# Patient Record
Sex: FEMALE | Race: WHITE | NOT HISPANIC OR LATINO | Employment: FULL TIME | URBAN - METROPOLITAN AREA
[De-identification: names, ages, dates, MRNs, and addresses within clinical notes are randomized per-mention and may not be internally consistent; named-entity substitution may affect disease eponyms.]

---

## 2017-06-30 ENCOUNTER — DOCUMENTATION (OUTPATIENT)
Dept: BARIATRICS/WEIGHT MGMT | Facility: CLINIC | Age: 47
End: 2017-06-30

## 2017-06-30 DIAGNOSIS — R53.83 FATIGUE, UNSPECIFIED TYPE: ICD-10-CM

## 2017-06-30 DIAGNOSIS — R10.13 DYSPEPSIA: Primary | ICD-10-CM

## 2017-06-30 DIAGNOSIS — R06.00 DYSPNEA, UNSPECIFIED TYPE: ICD-10-CM

## 2017-06-30 RX ORDER — MELOXICAM 15 MG/1
15 TABLET ORAL DAILY
COMMUNITY
End: 2017-07-27

## 2017-06-30 RX ORDER — FENOFIBRATE 145 MG/1
145 TABLET, COATED ORAL DAILY
COMMUNITY
End: 2017-11-27

## 2017-06-30 RX ORDER — TRIAMTERENE AND HYDROCHLOROTHIAZIDE 37.5; 25 MG/1; MG/1
1 TABLET ORAL DAILY
COMMUNITY
End: 2017-11-27

## 2017-06-30 RX ORDER — HYDROCODONE BITARTRATE AND ACETAMINOPHEN 5; 325 MG/1; MG/1
1 TABLET ORAL EVERY 6 HOURS PRN
COMMUNITY
End: 2018-01-24 | Stop reason: HOSPADM

## 2017-06-30 RX ORDER — LOSARTAN POTASSIUM AND HYDROCHLOROTHIAZIDE 25; 100 MG/1; MG/1
1 TABLET ORAL DAILY
COMMUNITY
End: 2018-01-21

## 2017-06-30 RX ORDER — CLONIDINE HYDROCHLORIDE 0.2 MG/1
0.2 TABLET ORAL 2 TIMES DAILY
COMMUNITY
End: 2017-07-27

## 2017-06-30 RX ORDER — CARVEDILOL 25 MG/1
37.5 TABLET ORAL 2 TIMES DAILY WITH MEALS
COMMUNITY
End: 2019-02-01

## 2017-06-30 RX ORDER — FELODIPINE 5 MG/1
5 TABLET, EXTENDED RELEASE ORAL DAILY
COMMUNITY
End: 2017-07-27

## 2017-07-26 DIAGNOSIS — R10.13 DYSPEPSIA: ICD-10-CM

## 2017-07-26 DIAGNOSIS — R53.83 FATIGUE, UNSPECIFIED TYPE: ICD-10-CM

## 2017-07-26 DIAGNOSIS — R06.00 DYSPNEA, UNSPECIFIED TYPE: ICD-10-CM

## 2017-07-27 ENCOUNTER — OFFICE VISIT (OUTPATIENT)
Dept: BARIATRICS/WEIGHT MGMT | Facility: CLINIC | Age: 47
End: 2017-07-27

## 2017-07-27 ENCOUNTER — DOCUMENTATION (OUTPATIENT)
Dept: BARIATRICS/WEIGHT MGMT | Facility: CLINIC | Age: 47
End: 2017-07-27

## 2017-07-27 VITALS
SYSTOLIC BLOOD PRESSURE: 122 MMHG | HEART RATE: 84 BPM | DIASTOLIC BLOOD PRESSURE: 88 MMHG | RESPIRATION RATE: 18 BRPM | BODY MASS INDEX: 38.29 KG/M2 | TEMPERATURE: 97.9 F | HEIGHT: 69 IN | WEIGHT: 258.5 LBS | OXYGEN SATURATION: 99 %

## 2017-07-27 DIAGNOSIS — R12 HEARTBURN: Primary | ICD-10-CM

## 2017-07-27 DIAGNOSIS — R53.83 FATIGUE, UNSPECIFIED TYPE: ICD-10-CM

## 2017-07-27 DIAGNOSIS — I10 ESSENTIAL HYPERTENSION: ICD-10-CM

## 2017-07-27 DIAGNOSIS — E66.9 OBESITY, CLASS II, BMI 35-39.9: ICD-10-CM

## 2017-07-27 PROCEDURE — 99205 OFFICE O/P NEW HI 60 MIN: CPT | Performed by: PHYSICIAN ASSISTANT

## 2017-07-27 RX ORDER — ESTRADIOL 1 MG/1
1 TABLET ORAL DAILY
COMMUNITY
End: 2017-11-27

## 2017-07-27 NOTE — PROGRESS NOTES
[facility] BARIATRIC SURGERY  [address]  [facility] [address]  [phone]      Patient  Name:  Nichelle Casey.  :  1970      Date of Visit: 2017      Chief Complaint:  weight gain; unable to maintain weight loss    History of Present Illness:  Nichelle Casey is a 47 y.o. female who presents today for evaluation, education and consultation regarding weight loss surgery. The patient is interested in sleeve gastrectomy.     Nichelle has been overweight for at least 15 years, has been 35 pounds or more overweight for at least 30 years, has been 100 pounds or more overweight for 7 or more years and started dieting at age 15.      Previous diet attempts include: High Protein, Low Carbohydrate, Low Fat, Calorie Counting, Carolyn's Diet, Cabbage Soup and Slim Fast; Weight Watchers; Dexatrim, Ionamin/Adipex and Phendiet.  The most weight Nichelle lost was 50 pounds on diet pills and weight watchers but was only able to maintain that weight loss for less than 1 year.  Her maximum lifetime weight is 258 pounds.      Past Medical History:   Diagnosis Date   • Chronic back pain     herniated disc w/ sciatica   • Dyspepsia    • Dyspnea on exertion    • Elevated LFTs    • Fatigue    • Heartburn     prn OTC meds   • Hormone replacement therapy (HRT)     shell-menopausal   • HTN (hypertension)    • Hypertriglyceridemia    • Obesity    • Osteoarthritis      Past Surgical History:   Procedure Laterality Date   • TUBAL ABDOMINAL LIGATION     • VAGINAL HYSTERECTOMY      partial - uterine fibroids   • WISDOM TOOTH EXTRACTION         Allergies   Allergen Reactions   • Prednisone Shortness Of Breath         Current Outpatient Prescriptions:   •  carvedilol (COREG) 25 MG tablet, Take 25 mg by mouth 2 (Two) Times a Day With Meals. 1.5 bid, Disp: , Rfl:   •  estradiol (ESTRACE) 1 MG tablet, Take 1 mg by mouth Daily., Disp: , Rfl:   •  fenofibrate (TRICOR) 145 MG tablet,  Take 145 mg by mouth Daily., Disp: , Rfl:   •  HYDROcodone-acetaminophen (NORCO) 5-325 MG per tablet, Take 1 tablet by mouth Every 6 (Six) Hours As Needed., Disp: , Rfl:   •  losartan-hydrochlorothiazide (HYZAAR) 100-25 MG per tablet, Take 1 tablet by mouth Daily., Disp: , Rfl:   •  triamterene-hydrochlorothiazide (MAXZIDE-25) 37.5-25 MG per tablet, Take 1 tablet by mouth Daily., Disp: , Rfl:     Social History     Social History   • Marital status:      Spouse name: Devang Casey   • Number of children: 2   • Years of education: 12th grade     Occupational History   •       Social History Main Topics   • Smoking status: Never Smoker   • Smokeless tobacco: Never Used   • Alcohol use No   • Drug use: No   • Sexual activity: Yes     Partners: Male      Comment:       Other Topics Concern   • Not on file     Social History Narrative    Lives in [place], [place] w/ .   for [employer].       Family History   Problem Relation Age of Onset   • Hypertension Mother    • Diabetes Father    • Hypertension Father    • Heart disease Father    • Prostate cancer Father    • Hypertension Sister    • Hypertension Maternal Grandmother    • Diabetes Maternal Grandmother    • Lung cancer Maternal Grandfather    • Emphysema Paternal Grandmother    • Heart attack Paternal Grandfather          Review of Systems:  Constitutional:  The patient reports fatigue, weight gain and denies fevers and chills.  Cardiovascular:  The patient reports HTN and denies heart disease and DVT.  Respiratory:  The patient denies asthma, apnea and PE.  Gastrointestinal:  The patient reports heartburn and denies gallbladder issues.  Genitourinary:  The patient denies renal insufficiency.    Musculoskeletal:  The patient reports joint pain, arthritis and denies fibromyalgia.  Neurological:  The patient denies seizure and stroke.  Psychiatric:  The patient denies anxiety, depression and bipolar  disorder.  Endocrine:  The patient denies diabetes and thyroid disease.  Hematologic:  The patient denies anemia and bleeding disorder.  Skin:  The patient denies MRSA.    Physical Exam:  Vital Signs:  Weight: 258 lb 8 oz (117 kg)   Body mass index is 38.73 kg/(m^2).  Temp: 97.9 °F (36.6 °C)   Heart Rate: 84   BP: 122/88     Physical Exam   Constitutional: She is oriented to person, place, and time. She appears well-developed and well-nourished.   HENT:   Head: Normocephalic and atraumatic.   Eyes: Conjunctivae are normal. No scleral icterus.   Neck: Neck supple. No thyromegaly present.   Cardiovascular: Normal rate and regular rhythm.    No murmur heard.  Pulmonary/Chest: Effort normal and breath sounds normal. No respiratory distress. She has no wheezes. She has no rales.   Abdominal: Soft. Bowel sounds are normal. She exhibits no distension and no mass. There is no tenderness. No hernia.   Musculoskeletal: Normal range of motion. She exhibits no edema.   Neurological: She is alert and oriented to person, place, and time. Gait normal.   Skin: Skin is warm and dry. No rash noted.   Psychiatric: She has a normal mood and affect. Judgment normal.   Vitals reviewed.         Patient Active Problem List   Diagnosis   • Obesity   • Fatigue   • Dyspepsia   • Dyspnea on exertion   • Hormone replacement therapy (HRT)   • HTN (hypertension)   • Hypertriglyceridemia   • Chronic back pain   • Heartburn   • Elevated LFTs   • Osteoarthritis       Assessment:    Nichelle Casey is a 47 y.o. year old female with medically complicated obesity pursuing sleeve gastrectomy.    Weight loss surgery is deemed medically necessary given the following obesity related comorbidities including hypertension with current Weight: 258 lb 8 oz (117 kg) and Body mass index is 38.73 kg/(m^2)..    Plan:  The consultation plan and program requirements were reviewed with the patient.  The patient has been advised that a letter of medical support must  be obtained from her primary care physician or referring provider. A psychological evaluation will be arranged.  A nutritional evaluation will be performed.  The patient was advised to start a high protein and low carbohydrate diet.  Necessary lifestyle modifications were discussed.  Instructions on how to access FARA was given to the patient.  FARA is an internet based educational video that explains the surgical procedure chosen and answers basic questions regarding that procedure.     Preoperative testing will include: CBC, CMP, Fasting Lipids, TSH, H.Pylori, CXR, EKG, EGD.    Additional preop clearances required prior to surgery: None.      Patient understands that bariatric surgery is not cosmetic surgery but rather a tool to help make a lifelong commitment to lifestyle changes including diet, exercise, behavior modifications, and healthy habits.      ANTIONE Robles

## 2017-07-27 NOTE — PROGRESS NOTES
"Weight Loss Surgery  Presurgical Nutrition Assessment     Nichelle Casey  07/27/2017  [id number]  [id number]  1970  female    Surgery desired: Sleeve Gastrectomy    Ht 68.5\" (174 cm);  Wt 258.5 # (117 kg);  BMI 38.7  Past Medical History:   Diagnosis Date   • Chronic back pain     herniated disc w/ sciatica   • Dyspepsia    • Dyspnea on exertion    • Fatigue    • Hormone replacement therapy (HRT)     shell-menopausal   • HTN (hypertension)    • Hypertriglyceridemia    • Obesity      Past Surgical History:   Procedure Laterality Date   • TUBAL ABDOMINAL LIGATION  1990   • VAGINAL HYSTERECTOMY  2009    partial - uterine fibroids   • WISDOM TOOTH EXTRACTION  1993     Allergies   Allergen Reactions   • Prednisone Shortness Of Breath       Current Outpatient Prescriptions:   •  carvedilol (COREG) 25 MG tablet, Take 25 mg by mouth 2 (Two) Times a Day With Meals. 1.5 bid, Disp: , Rfl:   •  estradiol (ESTRACE) 1 MG tablet, Take 1 mg by mouth Daily., Disp: , Rfl:   •  fenofibrate (TRICOR) 145 MG tablet, Take 145 mg by mouth Daily., Disp: , Rfl:   •  HYDROcodone-acetaminophen (NORCO) 5-325 MG per tablet, Take 1 tablet by mouth Every 6 (Six) Hours As Needed., Disp: , Rfl:   •  losartan-hydrochlorothiazide (HYZAAR) 100-25 MG per tablet, Take 1 tablet by mouth Daily., Disp: , Rfl:   •  triamterene-hydrochlorothiazide (MAXZIDE-25) 37.5-25 MG per tablet, Take 1 tablet by mouth Daily., Disp: , Rfl:       Nutrition Assessment    Estimated energy needs:  1850 kcal    Estimated calories for weight loss:  1350 kcal    IBW (Pounds):  165 #        Excess body weight (Pounds):  93.5 #       Nutrition Recall  24 Hour recall: (B) (L) (D) -  Reviewed and discussed with patient.  Loves breads & ingests multiple times throughout the day:  Cornbread, biscuits (with gravy); zucchini bread.  Eats vegetables & fruits regularly. 1 to 1.5 serv.meat/eggs qd.       Exercise  never      Education    Provided information packet containing " guidelines for healthy eating & choosing a protein supplement.  Discussed setting goals for replacing unhealthy eating habits with healthier ones.       Recommend that team proceed with surgery and follow per protocol.      Nutrition Goals   Dietary Guidelines per packet, as described above.  Protein goal:  grams per day   -140 g qd  Eat less bread & fewer snack foods.    Exercise Goals  Continue current exercise routine   Add 15-30 minutes of activity per day as tolerated      Paige Gardner RD  07/27/2017  11:40 AM

## 2017-07-31 LAB
ALBUMIN SERPL-MCNC: 4 G/DL (ref 3.2–4.8)
ALBUMIN/GLOB SERPL: 1.5 G/DL (ref 1.5–2.5)
ALP SERPL-CCNC: 71 U/L (ref 25–100)
ALT SERPL-CCNC: 30 U/L (ref 7–40)
AST SERPL-CCNC: 18 U/L (ref 0–33)
BILIRUB SERPL-MCNC: 0.8 MG/DL (ref 0.3–1.2)
BUN SERPL-MCNC: 11 MG/DL (ref 9–23)
BUN/CREAT SERPL: 15.7 (ref 7–25)
CALCIUM SERPL-MCNC: 8.9 MG/DL (ref 8.7–10.4)
CHLORIDE SERPL-SCNC: 104 MMOL/L (ref 99–109)
CHOLEST SERPL-MCNC: 165 MG/DL (ref 0–200)
CO2 SERPL-SCNC: 25 MMOL/L (ref 20–31)
CREAT SERPL-MCNC: 0.7 MG/DL (ref 0.6–1.3)
ERYTHROCYTE [DISTWIDTH] IN BLOOD BY AUTOMATED COUNT: 14.5 % (ref 11.3–14.5)
GLOBULIN SER CALC-MCNC: 2.6 GM/DL
GLUCOSE SERPL-MCNC: 99 MG/DL (ref 70–100)
H PYLORI IGA SER-ACNC: <9 UNITS (ref 0–8.9)
H PYLORI IGG SER IA-ACNC: <0.9 U/ML (ref 0–0.8)
H PYLORI IGM SER-ACNC: <9 UNITS (ref 0–8.9)
HCT VFR BLD AUTO: 37.7 % (ref 34.5–44)
HDLC SERPL-MCNC: 40 MG/DL (ref 40–60)
HGB BLD-MCNC: 12.4 G/DL (ref 11.5–15.5)
LDLC SERPL CALC-MCNC: 74 MG/DL (ref 0–100)
MCH RBC QN AUTO: 28.4 PG (ref 27–31)
MCHC RBC AUTO-ENTMCNC: 32.9 G/DL (ref 32–36)
MCV RBC AUTO: 86.3 FL (ref 80–99)
PLATELET # BLD AUTO: 187 10*3/MM3 (ref 150–450)
POTASSIUM SERPL-SCNC: 3.8 MMOL/L (ref 3.5–5.5)
PROT SERPL-MCNC: 6.6 G/DL (ref 5.7–8.2)
RBC # BLD AUTO: 4.37 10*6/MM3 (ref 3.89–5.14)
SODIUM SERPL-SCNC: 140 MMOL/L (ref 132–146)
TRIGL SERPL-MCNC: 253 MG/DL (ref 0–150)
TSH SERPL DL<=0.005 MIU/L-ACNC: 1.48 MIU/ML (ref 0.35–5.35)
VLDLC SERPL CALC-MCNC: 50.6 MG/DL
WBC # BLD AUTO: 7.93 10*3/MM3 (ref 3.5–10.8)

## 2017-08-11 ENCOUNTER — APPOINTMENT (OUTPATIENT)
Dept: PREADMISSION TESTING | Facility: HOSPITAL | Age: 47
End: 2017-08-11

## 2017-08-11 LAB
HCT VFR BLD AUTO: 37.7 % (ref 34.5–44)
POTASSIUM BLD-SCNC: 3.7 MMOL/L (ref 3.5–5.5)

## 2017-08-11 PROCEDURE — 36415 COLL VENOUS BLD VENIPUNCTURE: CPT

## 2017-08-11 PROCEDURE — 93005 ELECTROCARDIOGRAM TRACING: CPT

## 2017-08-11 PROCEDURE — 85014 HEMATOCRIT: CPT | Performed by: ANESTHESIOLOGY

## 2017-08-11 PROCEDURE — 84132 ASSAY OF SERUM POTASSIUM: CPT | Performed by: ANESTHESIOLOGY

## 2017-08-14 ENCOUNTER — OUTSIDE FACILITY SERVICE (OUTPATIENT)
Dept: BARIATRICS/WEIGHT MGMT | Facility: CLINIC | Age: 47
End: 2017-08-14

## 2017-08-14 ENCOUNTER — LAB REQUISITION (OUTPATIENT)
Dept: LAB | Facility: HOSPITAL | Age: 47
End: 2017-08-14

## 2017-08-14 DIAGNOSIS — R12 HEARTBURN: ICD-10-CM

## 2017-08-14 PROCEDURE — 43239 EGD BIOPSY SINGLE/MULTIPLE: CPT | Performed by: SURGERY

## 2017-08-14 PROCEDURE — 88305 TISSUE EXAM BY PATHOLOGIST: CPT | Performed by: SURGERY

## 2017-08-16 LAB
CYTO UR: NORMAL
LAB AP CASE REPORT: NORMAL
LAB AP CLINICAL INFORMATION: NORMAL
Lab: NORMAL
PATH REPORT.FINAL DX SPEC: NORMAL
PATH REPORT.GROSS SPEC: NORMAL

## 2017-11-16 DIAGNOSIS — R06.00 DYSPNEA, UNSPECIFIED TYPE: Primary | ICD-10-CM

## 2017-11-16 DIAGNOSIS — R06.00 DYSPNEA, UNSPECIFIED TYPE: ICD-10-CM

## 2017-11-16 DIAGNOSIS — R53.83 FATIGUE, UNSPECIFIED TYPE: ICD-10-CM

## 2017-11-27 ENCOUNTER — CONSULT (OUTPATIENT)
Dept: BARIATRICS/WEIGHT MGMT | Facility: CLINIC | Age: 47
End: 2017-11-27

## 2017-11-27 VITALS
RESPIRATION RATE: 18 BRPM | SYSTOLIC BLOOD PRESSURE: 160 MMHG | HEART RATE: 71 BPM | OXYGEN SATURATION: 99 % | DIASTOLIC BLOOD PRESSURE: 99 MMHG | TEMPERATURE: 97.6 F | HEIGHT: 69 IN | WEIGHT: 247 LBS | BODY MASS INDEX: 36.58 KG/M2

## 2017-11-27 DIAGNOSIS — E78.5 HYPERLIPIDEMIA, UNSPECIFIED HYPERLIPIDEMIA TYPE: ICD-10-CM

## 2017-11-27 DIAGNOSIS — R53.83 FATIGUE, UNSPECIFIED TYPE: Primary | ICD-10-CM

## 2017-11-27 DIAGNOSIS — R10.13 DYSPEPSIA: ICD-10-CM

## 2017-11-27 DIAGNOSIS — M54.9 BACK PAIN, UNSPECIFIED BACK LOCATION, UNSPECIFIED BACK PAIN LATERALITY, UNSPECIFIED CHRONICITY: ICD-10-CM

## 2017-11-27 DIAGNOSIS — E66.9 OBESITY, CLASS II, BMI 35-39.9: ICD-10-CM

## 2017-11-27 DIAGNOSIS — I10 ESSENTIAL HYPERTENSION: ICD-10-CM

## 2017-11-27 PROCEDURE — 99214 OFFICE O/P EST MOD 30 MIN: CPT | Performed by: SURGERY

## 2017-11-27 RX ORDER — SODIUM CHLORIDE 9 MG/ML
150 INJECTION, SOLUTION INTRAVENOUS CONTINUOUS
Status: CANCELLED | OUTPATIENT
Start: 2017-11-27

## 2017-11-27 RX ORDER — GABAPENTIN 300 MG/1
300 CAPSULE ORAL 3 TIMES DAILY
COMMUNITY
Start: 2017-11-10 | End: 2018-02-20

## 2017-11-27 RX ORDER — SCOLOPAMINE TRANSDERMAL SYSTEM 1 MG/1
1 PATCH, EXTENDED RELEASE TRANSDERMAL
Status: CANCELLED | OUTPATIENT
Start: 2017-11-27

## 2017-11-27 RX ORDER — PANTOPRAZOLE SODIUM 40 MG/10ML
40 INJECTION, POWDER, LYOPHILIZED, FOR SOLUTION INTRAVENOUS ONCE
Status: CANCELLED | OUTPATIENT
Start: 2017-11-27 | End: 2017-11-27

## 2017-11-27 RX ORDER — TRIAMTERENE AND HYDROCHLOROTHIAZIDE 37.5; 25 MG/1; MG/1
1 TABLET ORAL DAILY
COMMUNITY
End: 2018-01-24 | Stop reason: HOSPADM

## 2017-11-27 RX ORDER — TRIAMTERENE AND HYDROCHLOROTHIAZIDE 37.5; 25 MG/1; MG/1
CAPSULE ORAL
COMMUNITY
Start: 2017-11-18 | End: 2017-11-27 | Stop reason: SDUPTHER

## 2017-11-27 NOTE — H&P
[facility] BARIATRIC SURGERY  [address]  [facility] [address] [phone]        Patient  Name:  Nichelle Casey  :  1970        Date of Visit: 2017        Chief Complaint:  weight gain; unable to maintain weight loss     History of Present Illness:  Nichelle Casey is a 47 y.o. female who presents today for evaluation, education and consultation regarding weight loss surgery.      Patient has been overweight for many years, with numerous failed dietary/weight loss attempts.  She now has obesity related comorbidities of hypertension, hyperlipidemia, chronic back pain, dyspnea on exertion, fatigue, and OA, and as such has decided to pursue weight loss surgery.  She denies any change in medical history.  The patient attended >90 minute class today with power point presentation on the r/b/a of bariatric surgery, listened well, and asked appropriate questions that showed her understanding of the surgery, its risks, and expectation of dietary/medication/lifestyle compliance after surgery.       Review of data:     OSORIO: Norco 5, gabapentin  CBC: nl  CMP: K 3.2 (on Hctz)  EGD: PQ, 17 39 cm, no HH, De reflux, mild chronic gastritis  HP negative  Cardiac clearance: done, I do not have in hand but per patient cleared  Echo: n/a  Stress test: n/a  PFTs: n/a  Pulm clearance: n/a  EKG: NSR, nonspecific T wave abnormality  CXR: nl        Last tobacco: n/a  Last NSAIDs: 2 weeks ago  Last ASA: n/a  Last steroids: n/a  Last hormones: n/a         Medical History         Past Medical History:   Diagnosis Date   • Chronic back pain       herniated disc w/ sciatica   • Dyspepsia     • Dyspnea on exertion     • Elevated LFTs     • Fatigue     • Heartburn       prn OTC meds   • Hormone replacement therapy (HRT)       shell-menopausal   • HTN (hypertension)     • Hypertriglyceridemia     • Obesity     • Osteoarthritis            Surgical History          Past Surgical  History:   Procedure Laterality Date   • TUBAL ABDOMINAL LIGATION   1990   • VAGINAL HYSTERECTOMY   2009     partial - uterine fibroids, laparoscopic   • WISDOM TOOTH EXTRACTION   1993                 Allergies   Allergen Reactions   • Prednisone Shortness Of Breath         Current Outpatient Prescriptions:   •  carvedilol (COREG) 25 MG tablet, Take 25 mg by mouth 2 (Two) Times a Day With Meals. 1.5 bid, Disp: , Rfl:   •  HYDROcodone-acetaminophen (NORCO) 5-325 MG per tablet, Take 1 tablet by mouth Every 6 (Six) Hours As Needed., Disp: , Rfl:   •  losartan-hydrochlorothiazide (HYZAAR) 100-25 MG per tablet, Take 1 tablet by mouth Daily., Disp: , Rfl:   •  triamterene-hydrochlorothiazide (MAXZIDE-25) 37.5-25 MG per tablet, Take 1 tablet by mouth Daily., Disp: , Rfl:   •  gabapentin (NEURONTIN) 300 MG capsule, 300 mg 3 (Three) Times a Day., Disp: , Rfl:       Social History    Social History            Social History   • Marital status:        Spouse name: Devang Casey   • Number of children: 2   • Years of education: 12th grade           Occupational History   •                Social History Main Topics   • Smoking status: Never Smoker   • Smokeless tobacco: Never Used   • Alcohol use No   • Drug use: No   • Sexual activity: Yes       Partners: Male         Comment:             Other Topics Concern   • Not on file          Social History Narrative     Lives in [facility]/ .   for [employer].                 Family History   Problem Relation Age of Onset   • Hypertension Mother     • Diabetes Father     • Hypertension Father     • Heart disease Father     • Prostate cancer Father     • Hypertension Sister     • Hypertension Maternal Grandmother     • Diabetes Maternal Grandmother     • Lung cancer Maternal Grandfather     • Emphysema Paternal Grandmother     • Heart attack Paternal Grandfather           Review of Systems:  Constitutional:  The patient  reports fatigue, weight gain and denies fevers and chills.  Cardiovascular:  The patient reports HTN and denies heart disease and DVT.  Respiratory:  The patient reports none and denies asthma, apnea and PE.  Gastrointestinal:  The patient reports heartburn and denies pancreatitis, liver disease and IBS.  Genitourinary:  The patient denies renal insufficiency.    Musculoskeletal:  The patient reports joint pain, arthritis and denies fibromyalgia.  Neurological:  The patient reports none and denies seizure and stroke.  Psychiatric:  The patient reports none and denies anxiety, depression and bipolar disorder.  Endocrine:  The patient reports none and denies diabetes, thyroid disease and gout.  Hematologic:  The patient reports none and denies anemia and bleeding disorder.  Skin:  The patient denies MRSA.        Physical Exam:  Vital Signs:  Weight: 247 lb (112 kg)   Body mass index is 37.01 kg/(m^2).  Temp: 97.6 °F (36.4 °C)   Heart Rate: 71   BP: 160/99      Physical Exam   Constitutional: She is oriented to person, place, and time. She appears well-developed and well-nourished. No distress.   Eyes: Conjunctivae and EOM are normal. Pupils are equal, round, and reactive to light.   Neck: Normal range of motion. Neck supple. No tracheal deviation present. No thyromegaly present.   Cardiovascular: Normal rate, regular rhythm and normal heart sounds.    Pulmonary/Chest: Effort normal and breath sounds normal. No respiratory distress.   Abdominal: Soft. She exhibits no distension. There is no tenderness.       Musculoskeletal: Normal range of motion. She exhibits no edema or deformity.   Neurological: She is alert and oriented to person, place, and time. No cranial nerve deficit.   Skin: Skin is warm and dry. She is not diaphoretic. No erythema.   Psychiatric: She has a normal mood and affect. Her behavior is normal. Judgment and thought content normal.             Patient Active Problem List   Diagnosis   • Obesity   •  Fatigue   • Dyspepsia   • Dyspnea on exertion   • Hormone replacement therapy (HRT)   • HTN (hypertension)   • Hypertriglyceridemia   • Chronic back pain   • Heartburn   • Elevated LFTs   • Osteoarthritis         Assessment:     Nichelle Casey is a 47 y.o. year old female with medically complicated obesity.     Weight loss surgery is deemed medically necessary given the following obesity related comorbidities including hypertension, dyslipidemia, osteoarthritis, back pain, knee pain and GERD with current Weight: 247 lb (112 kg) and Body mass index is 37.01 kg/(m^2)..     Patient is aware that surgery is a tool, and that weight loss is not guaranteed but only seen in the context of appropriate use, follow up and exercise.     The patient was present for an approximately a 2.5 hour discussion of the purpose of weight loss surgery, how WLS is a tool to assist in achieving weight loss goals, the most common complications and how best to avoid them, and the strategies for short and long term weight loss.  Ample opportunity to discuss questions was available both in group and during the time of individual examination.     I reviewed all available preop labs, Xrays, tests, clearances, etc and signed off on these in the record.  All of this in addition to the patient's unique history and exam has been taken into consideration in determining their appropriate candidacy for weight loss surgery.     Complications  of laparoscopic/possible robotic gastric sleeve were discussed. The patient is well aware of the potential complications of surgery that include but not limited to bleeding, infections, deep venous thrombosis, pulmonary embolism, pulmonary complications such as pneumonia, cardiac events, hernias, small bowel obstruction, damage to the spleen or other organs, bowel injury, disfiguring scars, failure to lose weight, need for additional surgery, conversion to an open procedure, and death. Patient is also aware of  "complications which apply in this particular procedure that can include but are not limited to a \"leak\" at the staple line which in some instances may require conversion to gastric bypass.     The patient is aware if a hiatal hernia is encountered, it likely will be repaired.  R/B/A Rx to hiatal hernia repair were discussed as outlined in our long consent form.  Briefly risks in addition to those for LSG include recurrent hernia, LACIE, dysphagia, esophageal injury, pneumothorax, injury to the vagus nerves, injury to the thoracic duct, aorta or vena cava.     Greater than 3 minutes was spent with the patient discussing avoiding all tobacco products and second hand smoke at least 2 weeks pre-operatively and 6 weeks post-operatively to minimize the risk of sleeve leak.  This included discussing the importance of avoiding even secondhand smoke as the risk of leak is increased.  Examples discussed:  I made it very clear that the patient understands they should avoid even riding in a car where someone has previously smoked in the last 2 weeks, living in a house where someone smokes (even if it's in a separate room/patio/attached garage, etc.) we discussed that they should not have a conversation with a group of people who are smoking even if it's outside.  They can be around wood burning fires and barbecue.  I told them I do not know if marijuana has a same effects but my overall recommendation is to avoid it for 2 weeks prior in 6 weeks after surgery.  They also are aware that nicotine may also increase the risk of leak and I strongly encouraged him to avoid that as well for 2 weeks prior in 6 weeks after surgery.     Discussed the risks, benefits and alternative therapies at great length as outlined in our extensive consent forms, consent videos, and educational teaching process under the direction of the center's .     A copy of the patient's signed informed consent is on file.     Plan:  Laparoscopic " sleeve gastrectomy         Millie Mccann MD

## 2017-11-28 PROBLEM — E66.9 OBESITY, CLASS II, BMI 35-39.9: Status: ACTIVE | Noted: 2017-11-28

## 2017-12-11 ENCOUNTER — APPOINTMENT (OUTPATIENT)
Dept: PREADMISSION TESTING | Facility: HOSPITAL | Age: 47
End: 2017-12-11

## 2018-01-04 ENCOUNTER — CONSULT (OUTPATIENT)
Dept: BARIATRICS/WEIGHT MGMT | Facility: CLINIC | Age: 48
End: 2018-01-04

## 2018-01-04 VITALS
HEART RATE: 77 BPM | DIASTOLIC BLOOD PRESSURE: 89 MMHG | HEIGHT: 69 IN | WEIGHT: 246.5 LBS | BODY MASS INDEX: 36.51 KG/M2 | RESPIRATION RATE: 18 BRPM | SYSTOLIC BLOOD PRESSURE: 135 MMHG | TEMPERATURE: 98.1 F | OXYGEN SATURATION: 99 %

## 2018-01-04 DIAGNOSIS — M54.9 BACK PAIN, UNSPECIFIED BACK LOCATION, UNSPECIFIED BACK PAIN LATERALITY, UNSPECIFIED CHRONICITY: ICD-10-CM

## 2018-01-04 DIAGNOSIS — R06.00 DYSPNEA, UNSPECIFIED TYPE: ICD-10-CM

## 2018-01-04 DIAGNOSIS — R12 HEARTBURN: ICD-10-CM

## 2018-01-04 DIAGNOSIS — I10 ESSENTIAL HYPERTENSION: ICD-10-CM

## 2018-01-04 DIAGNOSIS — R10.13 DYSPEPSIA: ICD-10-CM

## 2018-01-04 DIAGNOSIS — E78.5 HYPERLIPIDEMIA, UNSPECIFIED HYPERLIPIDEMIA TYPE: ICD-10-CM

## 2018-01-04 DIAGNOSIS — E66.9 OBESITY, CLASS II, BMI 35-39.9: ICD-10-CM

## 2018-01-04 DIAGNOSIS — R53.83 FATIGUE, UNSPECIFIED TYPE: Primary | ICD-10-CM

## 2018-01-04 PROCEDURE — 99214 OFFICE O/P EST MOD 30 MIN: CPT | Performed by: SURGERY

## 2018-01-04 NOTE — PROGRESS NOTES
[facility] BARIATRIC SURGERY  [address]  [facility] [address]  [phone]      Patient  Name:  Nichelle Casey  :  1970      Date of Visit: 2018      Chief Complaint:  weight gain; unable to maintain weight loss    History of Present Illness:  Nichelle Casey is a 47 y.o. female who presents today for evaluation, education and consultation regarding weight loss surgery.      Nichelle Casey is a 47 y.o. female who presents today for evaluation, education and consultation regarding weight loss surgery.     Nichelle returns for last visit today. She has been overweight for many years with may failed weight loss attempts.  She now has comorbidities of HTN, HLD, chronic back pain, dyspnea on exertion, fatigue, and OA, and has decided to pursue weight loss surgery.  There has been no change in medical history.  Since last visit, she has obtained Cardiac clearance for surgery, and no further cardiac workup was needed.    She has attended >90 minutes class today and listened attentively, asking appropriate questions that demonstrated her understanding adherence to dietary/medication/lifestyle/vitamin recommendations.     Review of data:    OSORIO: Norco 5, gabapentin  CBC: nl  CMP: K 3.2 (on Hctz)  EGD: PQ, 17 39 cm, no HH, De reflux, mild chronic gastritis  HP negative  Cardiac clearance: 2017, cleared  Echo: n/a  Stress test: n/a  PFTs: n/a  Pulm clearance: n/a  EKG: NSR, nonspecific T wave abnormality  CXR: nl      Last tobacco: n/a  Last NSAIDs: 2 weeks ago  Last ASA: n/a  Last steroids: n/a  Last hormones: n/a      Past Medical History:   Diagnosis Date   • Chronic back pain     herniated disc w/ sciatica   • Dyspepsia    • Dyspnea on exertion    • Elevated LFTs    • Fatigue    • Heartburn     prn OTC meds   • Hormone replacement therapy (HRT)     shell-menopausal   • HTN (hypertension)    • Hypertriglyceridemia    • Obesity    • Osteoarthritis       Past Surgical History:   Procedure Laterality Date   • TUBAL ABDOMINAL LIGATION  1990   • VAGINAL HYSTERECTOMY  2009    partial - uterine fibroids, laparoscopic   • WISDOM TOOTH EXTRACTION  1993       Allergies   Allergen Reactions   • Prednisone Shortness Of Breath       Current Outpatient Prescriptions:   •  carvedilol (COREG) 25 MG tablet, Take 25 mg by mouth 2 (Two) Times a Day With Meals. 1.5 bid, Disp: , Rfl:   •  gabapentin (NEURONTIN) 300 MG capsule, 300 mg 3 (Three) Times a Day., Disp: , Rfl:   •  losartan-hydrochlorothiazide (HYZAAR) 100-25 MG per tablet, Take 1 tablet by mouth Daily., Disp: , Rfl:   •  triamterene-hydrochlorothiazide (MAXZIDE-25) 37.5-25 MG per tablet, Take 1 tablet by mouth Daily., Disp: , Rfl:   •  HYDROcodone-acetaminophen (NORCO) 5-325 MG per tablet, Take 1 tablet by mouth Every 6 (Six) Hours As Needed., Disp: , Rfl:     Social History     Social History   • Marital status:      Spouse name: Devang Casey   • Number of children: 2   • Years of education: 12th grade     Occupational History   •       Social History Main Topics   • Smoking status: Never Smoker   • Smokeless tobacco: Never Used   • Alcohol use No   • Drug use: No   • Sexual activity: Yes     Partners: Male      Comment:       Other Topics Concern   • Not on file     Social History Narrative    Lives in [facility]/ .   for [employer].       Family History   Problem Relation Age of Onset   • Hypertension Mother    • Diabetes Father    • Hypertension Father    • Heart disease Father    • Prostate cancer Father    • Hypertension Sister    • Hypertension Maternal Grandmother    • Diabetes Maternal Grandmother    • Lung cancer Maternal Grandfather    • Emphysema Paternal Grandmother    • Heart attack Paternal Grandfather        Review of Systems:  Constitutional:  The patient reports fatigue, weight gain and denies fevers and chills.  Cardiovascular:  The  patient reports HTN and denies heart disease and DVT.  Respiratory:  The patient reports none and denies asthma, apnea and PE.  Gastrointestinal:  The patient reports heartburn and denies pancreatitis, liver disease and IBS.  Genitourinary:  The patient denies renal insufficiency.    Musculoskeletal:  The patient reports joint pain, arthritis and denies fibromyalgia.  Neurological:  The patient reports none and denies seizure and stroke.  Psychiatric:  The patient reports none and denies anxiety, depression and bipolar disorder.  Endocrine:  The patient reports none and denies diabetes, thyroid disease and gout.  Hematologic:  The patient reports none and denies anemia and bleeding disorder.  Skin:  The patient denies MRSA.    Physical Exam:  Vital Signs:  Weight: 112 kg (246 lb 8 oz)   Body mass index is 36.94 kg/(m^2).  Temp: 98.1 °F (36.7 °C)   Heart Rate: 77   BP: 135/89     Physical Exam   Constitutional: She is oriented to person, place, and time. She appears well-developed and well-nourished. No distress.   HENT:   Head: Normocephalic and atraumatic.   Mouth/Throat: No oropharyngeal exudate.   Eyes: Conjunctivae and EOM are normal. Pupils are equal, round, and reactive to light.   Neck: Normal range of motion. Neck supple. No tracheal deviation present. No thyromegaly present.   Cardiovascular: Normal rate, regular rhythm and normal heart sounds.    Pulmonary/Chest: Effort normal and breath sounds normal.   Abdominal: Soft. She exhibits no distension. There is no tenderness.   umbi hernia, small umbilical port site incision   Musculoskeletal: Normal range of motion. She exhibits no edema or deformity.   Neurological: She is alert and oriented to person, place, and time. No cranial nerve deficit.   Skin: Skin is warm and dry. No rash noted. She is not diaphoretic. No erythema.   Psychiatric: She has a normal mood and affect. Her behavior is normal. Judgment and thought content normal.       Patient Active  Problem List   Diagnosis   • Obesity   • Fatigue   • Dyspepsia   • Dyspnea on exertion   • Hormone replacement therapy (HRT)   • HTN (hypertension)   • Hypertriglyceridemia   • Chronic back pain   • Heartburn   • Elevated LFTs   • Osteoarthritis   • Obesity, Class II, BMI 35-39.9       Assessment:    Nichelle Casey is a 47 y.o. year old female with medically complicated obesity.    Weight loss surgery is deemed medically necessary given the following obesity related comorbidities including hypertension, dyslipidemia, osteoarthritis, back pain and GERD with current Weight: 112 kg (246 lb 8 oz) and Body mass index is 36.94 kg/(m^2)..    Patient is aware that surgery is a tool, and that weight loss is not guaranteed but only seen in the context of appropriate use, follow up and exercise.    The patient was present for an approximately a 2.5 hour discussion of the purpose of weight loss surgery, how WLS is a tool to assist in achieving weight loss goals, the most common complications and how best to avoid them, and the strategies for short and long term weight loss.  Ample opportunity to discuss questions was available both in group and during the time of individual examination.    I reviewed all available preop labs, Xrays, tests, clearances, etc and signed off on these in the record.  All of this in addition to the patient's unique history and exam has been taken into consideration in determining their appropriate candidacy for weight loss surgery.    Complications  of laparoscopic/possible robotic gastric sleeve were discussed. The patient is well aware of the potential complications of surgery that include but not limited to bleeding, infections, deep venous thrombosis, pulmonary embolism, pulmonary complications such as pneumonia, cardiac events, hernias, small bowel obstruction, damage to the spleen or other organs, bowel injury, disfiguring scars, failure to lose weight, need for additional surgery, conversion  "to an open procedure, and death. Patient is also aware of complications which apply in this particular procedure that can include but are not limited to a \"leak\" at the staple line which in some instances may require conversion to gastric bypass.    The patient is aware if a hiatal hernia is encountered, it likely will be repaired.  R/B/A Rx to hiatal hernia repair were discussed as outlined in our long consent form.  Briefly risks in addition to those for LSG include recurrent hernia, LACIE, dysphagia, esophageal injury, pneumothorax, injury to the vagus nerves, injury to the thoracic duct, aorta or vena cava.    Greater than 3 minutes was spent with the patient discussing avoiding all tobacco products and second hand smoke at least 2 weeks pre-operatively and 6 weeks post-operatively to minimize the risk of sleeve leak.  This included discussing the importance of avoiding even secondhand smoke as the risk of leak is increased.  Examples discussed:  I made it very clear that the patient understands they should avoid even riding in a car where someone has previously smoked in the last 2 weeks, living in a house where someone smokes (even if it's in a separate room/patio/attached garage, etc.) we discussed that they should not have a conversation with a group of people who are smoking even if it's outside.  They can be around wood burning fires and barbecue.  I told them I do not know if marijuana has a same effects but my overall recommendation is to avoid it for 2 weeks prior in 6 weeks after surgery.  They also are aware that nicotine may also increase the risk of leak and I strongly encouraged him to avoid that as well for 2 weeks prior in 6 weeks after surgery.    Discussed the risks, benefits and alternative therapies at great length as outlined in our extensive consent forms, consent videos, and educational teaching process under the direction of the center's .    A copy of the patient's " signed informed consent is on file.    Plan:  Laparoscopic sleeve gastrectomy         Millie Mccann MD

## 2018-01-04 NOTE — PROGRESS NOTES
[facility] BARIATRIC SURGERY  [address]  [facility] [id number] [phone]      Patient  Name:  Nichelle Casey  :  1970      Date of Visit: 2018      Chief Complaint:  weight gain; unable to maintain weight loss    History of Present Illness:  Nichelle Casey is a 47 y.o. female who presents today for evaluation, education and consultation regarding weight loss surgery.     Patient has been overweight for many years, with numerous failed dietary/weight loss attempts.  She now has obesity related comorbidities of hypertension, hyperlipidemia, chronic back pain, dyspnea on exertion, fatigue, and OA, and as such has decided to pursue weight loss surgery.  She denies any change in medical history.  The patient attended >90 minute class today with power point presentation on the r/b/a of bariatric surgery, listened well, and asked appropriate questions that showed her understanding of the surgery, its risks, and expectation of dietary/medication/lifestyle compliance after surgery.      Review of data:    OSORIO: Norco 5, gabapentin  CBC: nl  CMP: K 3.2 (on Hctz)  EGD: PQ, 17 39 cm, no HH, De reflux, mild chronic gastritis  HP negative  Cardiac clearance: done, I do not have in hand but per patient cleared  Echo: n/a  Stress test: n/a  PFTs: n/a  Pulm clearance: n/a  EKG: NSR, nonspecific T wave abnormality  CXR: nl      Last tobacco: n/a  Last NSAIDs: 2 weeks ago  Last ASA: n/a  Last steroids: n/a  Last hormones: n/a      Past Medical History:   Diagnosis Date   • Chronic back pain     herniated disc w/ sciatica   • Dyspepsia    • Dyspnea on exertion    • Elevated LFTs    • Fatigue    • Heartburn     prn OTC meds   • Hormone replacement therapy (HRT)     shell-menopausal   • HTN (hypertension)    • Hypertriglyceridemia    • Obesity    • Osteoarthritis      Past Surgical History:   Procedure Laterality Date   • TUBAL ABDOMINAL LIGATION     • VAGINAL  HYSTERECTOMY  2009    partial - uterine fibroids, laparoscopic   • WISDOM TOOTH EXTRACTION  1993       Allergies   Allergen Reactions   • Prednisone Shortness Of Breath       Current Outpatient Prescriptions:   •  carvedilol (COREG) 25 MG tablet, Take 25 mg by mouth 2 (Two) Times a Day With Meals. 1.5 bid, Disp: , Rfl:   •  gabapentin (NEURONTIN) 300 MG capsule, 300 mg 3 (Three) Times a Day., Disp: , Rfl:   •  losartan-hydrochlorothiazide (HYZAAR) 100-25 MG per tablet, Take 1 tablet by mouth Daily., Disp: , Rfl:   •  triamterene-hydrochlorothiazide (MAXZIDE-25) 37.5-25 MG per tablet, Take 1 tablet by mouth Daily., Disp: , Rfl:   •  HYDROcodone-acetaminophen (NORCO) 5-325 MG per tablet, Take 1 tablet by mouth Every 6 (Six) Hours As Needed., Disp: , Rfl:     Social History     Social History   • Marital status:      Spouse name: Devang Casey   • Number of children: 2   • Years of education: 12th grade     Occupational History   •       Social History Main Topics   • Smoking status: Never Smoker   • Smokeless tobacco: Never Used   • Alcohol use No   • Drug use: No   • Sexual activity: Yes     Partners: Male      Comment:       Other Topics Concern   • Not on file     Social History Narrative    Lives in [place], [place] w/ .   for [employer].       Family History   Problem Relation Age of Onset   • Hypertension Mother    • Diabetes Father    • Hypertension Father    • Heart disease Father    • Prostate cancer Father    • Hypertension Sister    • Hypertension Maternal Grandmother    • Diabetes Maternal Grandmother    • Lung cancer Maternal Grandfather    • Emphysema Paternal Grandmother    • Heart attack Paternal Grandfather        Review of Systems:  Constitutional:  The patient reports fatigue, weight gain and denies fevers and chills.  Cardiovascular:  The patient reports HTN and denies heart disease and DVT.  Respiratory:  The patient reports none and  denies asthma, apnea and PE.  Gastrointestinal:  The patient reports heartburn and denies pancreatitis, liver disease and IBS.  Genitourinary:  The patient denies renal insufficiency.    Musculoskeletal:  The patient reports joint pain, arthritis and denies fibromyalgia.  Neurological:  The patient reports none and denies seizure and stroke.  Psychiatric:  The patient reports none and denies anxiety, depression and bipolar disorder.  Endocrine:  The patient reports none and denies diabetes, thyroid disease and gout.  Hematologic:  The patient reports none and denies anemia and bleeding disorder.  Skin:  The patient denies MRSA.      Physical Exam:  Vital Signs:  Weight: 112 kg (246 lb 8 oz)   Body mass index is 36.94 kg/(m^2).  Temp: 98.1 °F (36.7 °C)   Heart Rate: 77   BP: 135/89     Physical Exam   Constitutional: She is oriented to person, place, and time. She appears well-developed and well-nourished. No distress.   Eyes: Conjunctivae and EOM are normal. Pupils are equal, round, and reactive to light.   Neck: Normal range of motion. Neck supple. No tracheal deviation present. No thyromegaly present.   Cardiovascular: Normal rate, regular rhythm and normal heart sounds.    Pulmonary/Chest: Effort normal and breath sounds normal. No respiratory distress.   Abdominal: Soft. She exhibits no distension. There is no tenderness.       Musculoskeletal: Normal range of motion. She exhibits no edema or deformity.   Neurological: She is alert and oriented to person, place, and time. No cranial nerve deficit.   Skin: Skin is warm and dry. She is not diaphoretic. No erythema.   Psychiatric: She has a normal mood and affect. Her behavior is normal. Judgment and thought content normal.       Patient Active Problem List   Diagnosis   • Obesity   • Fatigue   • Dyspepsia   • Dyspnea on exertion   • Hormone replacement therapy (HRT)   • HTN (hypertension)   • Hypertriglyceridemia   • Chronic back pain   • Heartburn   • Elevated  "LFTs   • Osteoarthritis   • Obesity, Class II, BMI 35-39.9       Assessment:    Nichelle Casey is a 47 y.o. year old female with medically complicated obesity.    Weight loss surgery is deemed medically necessary given the following obesity related comorbidities including hypertension, dyslipidemia, osteoarthritis, back pain, knee pain and GERD with current Weight: 112 kg (246 lb 8 oz) and Body mass index is 36.94 kg/(m^2)..    Patient is aware that surgery is a tool, and that weight loss is not guaranteed but only seen in the context of appropriate use, follow up and exercise.    The patient was present for an approximately a 2.5 hour discussion of the purpose of weight loss surgery, how WLS is a tool to assist in achieving weight loss goals, the most common complications and how best to avoid them, and the strategies for short and long term weight loss.  Ample opportunity to discuss questions was available both in group and during the time of individual examination.    I reviewed all available preop labs, Xrays, tests, clearances, etc and signed off on these in the record.  All of this in addition to the patient's unique history and exam has been taken into consideration in determining their appropriate candidacy for weight loss surgery.    Complications  of laparoscopic/possible robotic gastric sleeve were discussed. The patient is well aware of the potential complications of surgery that include but not limited to bleeding, infections, deep venous thrombosis, pulmonary embolism, pulmonary complications such as pneumonia, cardiac events, hernias, small bowel obstruction, damage to the spleen or other organs, bowel injury, disfiguring scars, failure to lose weight, need for additional surgery, conversion to an open procedure, and death. Patient is also aware of complications which apply in this particular procedure that can include but are not limited to a \"leak\" at the staple line which in some instances may " require conversion to gastric bypass.    The patient is aware if a hiatal hernia is encountered, it likely will be repaired.  R/B/A Rx to hiatal hernia repair were discussed as outlined in our long consent form.  Briefly risks in addition to those for LSG include recurrent hernia, LACIE, dysphagia, esophageal injury, pneumothorax, injury to the vagus nerves, injury to the thoracic duct, aorta or vena cava.    Greater than 3 minutes was spent with the patient discussing avoiding all tobacco products and second hand smoke at least 2 weeks pre-operatively and 6 weeks post-operatively to minimize the risk of sleeve leak.  This included discussing the importance of avoiding even secondhand smoke as the risk of leak is increased.  Examples discussed:  I made it very clear that the patient understands they should avoid even riding in a car where someone has previously smoked in the last 2 weeks, living in a house where someone smokes (even if it's in a separate room/patio/attached garage, etc.) we discussed that they should not have a conversation with a group of people who are smoking even if it's outside.  They can be around wood burning fires and barbecue.  I told them I do not know if marijuana has a same effects but my overall recommendation is to avoid it for 2 weeks prior in 6 weeks after surgery.  They also are aware that nicotine may also increase the risk of leak and I strongly encouraged him to avoid that as well for 2 weeks prior in 6 weeks after surgery.    Discussed the risks, benefits and alternative therapies at great length as outlined in our extensive consent forms, consent videos, and educational teaching process under the direction of the center's .    A copy of the patient's signed informed consent is on file.    Plan:  Laparoscopic sleeve gastrectomy       Millie Mccann MD

## 2018-01-21 ENCOUNTER — APPOINTMENT (OUTPATIENT)
Dept: PREADMISSION TESTING | Facility: HOSPITAL | Age: 48
End: 2018-01-21

## 2018-01-21 LAB
DEPRECATED RDW RBC AUTO: 42.3 FL (ref 37–54)
ERYTHROCYTE [DISTWIDTH] IN BLOOD BY AUTOMATED COUNT: 14.6 % (ref 11.3–14.5)
HBA1C MFR BLD: 5.3 % (ref 4.8–5.6)
HCT VFR BLD AUTO: 39.3 % (ref 34.5–44)
HGB BLD-MCNC: 12.7 G/DL (ref 11.5–15.5)
MCH RBC QN AUTO: 25.8 PG (ref 27–31)
MCHC RBC AUTO-ENTMCNC: 32.3 G/DL (ref 32–36)
MCV RBC AUTO: 79.9 FL (ref 80–99)
PLATELET # BLD AUTO: 221 10*3/MM3 (ref 150–450)
PMV BLD AUTO: 9.6 FL (ref 6–12)
POTASSIUM BLD-SCNC: 3.9 MMOL/L (ref 3.5–5.5)
RBC # BLD AUTO: 4.92 10*6/MM3 (ref 3.89–5.14)
WBC NRBC COR # BLD: 7.05 10*3/MM3 (ref 3.5–10.8)

## 2018-01-21 PROCEDURE — 84132 ASSAY OF SERUM POTASSIUM: CPT | Performed by: ANESTHESIOLOGY

## 2018-01-21 PROCEDURE — 36415 COLL VENOUS BLD VENIPUNCTURE: CPT

## 2018-01-21 PROCEDURE — 85027 COMPLETE CBC AUTOMATED: CPT | Performed by: ANESTHESIOLOGY

## 2018-01-21 PROCEDURE — 83036 HEMOGLOBIN GLYCOSYLATED A1C: CPT | Performed by: ANESTHESIOLOGY

## 2018-01-21 RX ORDER — LOSARTAN POTASSIUM 100 MG/1
100 TABLET ORAL DAILY
COMMUNITY
End: 2019-02-01

## 2018-01-23 ENCOUNTER — ANESTHESIA EVENT (OUTPATIENT)
Dept: PERIOP | Facility: HOSPITAL | Age: 48
End: 2018-01-23

## 2018-01-23 ENCOUNTER — HOSPITAL ENCOUNTER (INPATIENT)
Facility: HOSPITAL | Age: 48
LOS: 1 days | Discharge: HOME | End: 2018-01-24
Attending: SURGERY | Admitting: SURGERY

## 2018-01-23 ENCOUNTER — ANESTHESIA (OUTPATIENT)
Dept: PERIOP | Facility: HOSPITAL | Age: 48
End: 2018-01-23

## 2018-01-23 DIAGNOSIS — E66.9 OBESITY, CLASS II, BMI 35-39.9: ICD-10-CM

## 2018-01-23 PROCEDURE — 25010000002 MIDAZOLAM PER 1 MG: Performed by: NURSE ANESTHETIST, CERTIFIED REGISTERED

## 2018-01-23 PROCEDURE — 25010000003 CEFAZOLIN IN DEXTROSE 2-4 GM/100ML-% SOLUTION: Performed by: SURGERY

## 2018-01-23 PROCEDURE — 88307 TISSUE EXAM BY PATHOLOGIST: CPT | Performed by: SURGERY

## 2018-01-23 PROCEDURE — 25010000002 BUPRENORPHINE PER 0.1 MG: Performed by: NURSE ANESTHETIST, CERTIFIED REGISTERED

## 2018-01-23 PROCEDURE — 25010000002 FENTANYL CITRATE (PF) 100 MCG/2ML SOLUTION: Performed by: NURSE ANESTHETIST, CERTIFIED REGISTERED

## 2018-01-23 PROCEDURE — 25010000002 PROPOFOL 1000 MG/ML EMULSION: Performed by: NURSE ANESTHETIST, CERTIFIED REGISTERED

## 2018-01-23 PROCEDURE — 25010000002 ONDANSETRON PER 1 MG: Performed by: NURSE ANESTHETIST, CERTIFIED REGISTERED

## 2018-01-23 PROCEDURE — 43281 LAP PARAESOPHAG HERN REPAIR: CPT | Performed by: SURGERY

## 2018-01-23 PROCEDURE — 25010000002 NEOSTIGMINE PER 0.5 MG: Performed by: NURSE ANESTHETIST, CERTIFIED REGISTERED

## 2018-01-23 PROCEDURE — 25010000002 HYDROMORPHONE PER 4 MG: Performed by: NURSE ANESTHETIST, CERTIFIED REGISTERED

## 2018-01-23 PROCEDURE — 0DJ08ZZ INSPECTION OF UPPER INTESTINAL TRACT, VIA NATURAL OR ARTIFICIAL OPENING ENDOSCOPIC: ICD-10-PCS | Performed by: SURGERY

## 2018-01-23 PROCEDURE — 25010000002 ENOXAPARIN PER 10 MG: Performed by: SURGERY

## 2018-01-23 PROCEDURE — 0DB64Z3 EXCISION OF STOMACH, PERCUTANEOUS ENDOSCOPIC APPROACH, VERTICAL: ICD-10-PCS | Performed by: SURGERY

## 2018-01-23 PROCEDURE — 0BQT4ZZ REPAIR DIAPHRAGM, PERCUTANEOUS ENDOSCOPIC APPROACH: ICD-10-PCS | Performed by: SURGERY

## 2018-01-23 PROCEDURE — 94799 UNLISTED PULMONARY SVC/PX: CPT

## 2018-01-23 PROCEDURE — 25010000002 HYDROMORPHONE PER 4 MG: Performed by: SURGERY

## 2018-01-23 PROCEDURE — 43775 LAP SLEEVE GASTRECTOMY: CPT | Performed by: SURGERY

## 2018-01-23 PROCEDURE — 25010000002 PROPOFOL 10 MG/ML EMULSION: Performed by: NURSE ANESTHETIST, CERTIFIED REGISTERED

## 2018-01-23 DEVICE — PERI-STRIPS DRY WITH VERITAS COLLAGEN MATRIX (PSD-V) IS PREPARED FROM DEHYDRATED BOVINE PERICARDIUM PROCURED FROM CATTLE UNDER 30 MONTHS OF AGE IN THE UNITED STATES. ONE (1) TUBE OF PSD GEL (GEL) IS PROVIDED FOR EVERY TWO (2) POUCHES OF PSD-V. THE GEL IS USED TO CREATE A TEMPORARY BOND BETWEEN THE PSD-V BUTTRESS AND THE SURGICAL STAPLER JAWS UNTIL THE STAPLER IS POSITIONED AND FIRED.
Type: IMPLANTABLE DEVICE | Site: STOMACH | Status: FUNCTIONAL
Brand: PERI-STRIPS DRY WITH VERITAS COLLAGEN MATRIX

## 2018-01-23 DEVICE — SEALANT FIBRIN TISSEEL FZ 4ML: Type: IMPLANTABLE DEVICE | Site: STOMACH | Status: FUNCTIONAL

## 2018-01-23 RX ORDER — PROMETHAZINE HYDROCHLORIDE 25 MG/ML
12.5 INJECTION, SOLUTION INTRAMUSCULAR; INTRAVENOUS ONCE AS NEEDED
Status: DISCONTINUED | OUTPATIENT
Start: 2018-01-23 | End: 2018-01-23 | Stop reason: HOSPADM

## 2018-01-23 RX ORDER — PROMETHAZINE HYDROCHLORIDE 25 MG/ML
12.5 INJECTION, SOLUTION INTRAMUSCULAR; INTRAVENOUS EVERY 6 HOURS PRN
Status: DISCONTINUED | OUTPATIENT
Start: 2018-01-23 | End: 2018-01-24 | Stop reason: HOSPADM

## 2018-01-23 RX ORDER — HYDROMORPHONE HYDROCHLORIDE 2 MG/1
2 TABLET ORAL
Status: DISCONTINUED | OUTPATIENT
Start: 2018-01-23 | End: 2018-01-24 | Stop reason: HOSPADM

## 2018-01-23 RX ORDER — SODIUM CHLORIDE, SODIUM LACTATE, POTASSIUM CHLORIDE, CALCIUM CHLORIDE 600; 310; 30; 20 MG/100ML; MG/100ML; MG/100ML; MG/100ML
9 INJECTION, SOLUTION INTRAVENOUS CONTINUOUS
Status: DISCONTINUED | OUTPATIENT
Start: 2018-01-23 | End: 2018-01-23 | Stop reason: HOSPADM

## 2018-01-23 RX ORDER — FENTANYL CITRATE 50 UG/ML
50 INJECTION, SOLUTION INTRAMUSCULAR; INTRAVENOUS
Status: DISCONTINUED | OUTPATIENT
Start: 2018-01-23 | End: 2018-01-23 | Stop reason: HOSPADM

## 2018-01-23 RX ORDER — PROMETHAZINE HYDROCHLORIDE 25 MG/1
25 TABLET ORAL ONCE AS NEEDED
Status: DISCONTINUED | OUTPATIENT
Start: 2018-01-23 | End: 2018-01-23 | Stop reason: HOSPADM

## 2018-01-23 RX ORDER — SODIUM CHLORIDE, SODIUM LACTATE, POTASSIUM CHLORIDE, CALCIUM CHLORIDE 600; 310; 30; 20 MG/100ML; MG/100ML; MG/100ML; MG/100ML
150 INJECTION, SOLUTION INTRAVENOUS CONTINUOUS
Status: ACTIVE | OUTPATIENT
Start: 2018-01-23 | End: 2018-01-24

## 2018-01-23 RX ORDER — CEFAZOLIN SODIUM 2 G/100ML
2 INJECTION, SOLUTION INTRAVENOUS EVERY 8 HOURS
Status: COMPLETED | OUTPATIENT
Start: 2018-01-23 | End: 2018-01-24

## 2018-01-23 RX ORDER — PROCHLORPERAZINE 25 MG
25 SUPPOSITORY, RECTAL RECTAL EVERY 12 HOURS PRN
Status: DISCONTINUED | OUTPATIENT
Start: 2018-01-23 | End: 2018-01-24 | Stop reason: HOSPADM

## 2018-01-23 RX ORDER — HYDROMORPHONE HYDROCHLORIDE 1 MG/ML
0.5 INJECTION, SOLUTION INTRAMUSCULAR; INTRAVENOUS; SUBCUTANEOUS
Status: DISCONTINUED | OUTPATIENT
Start: 2018-01-23 | End: 2018-01-23 | Stop reason: HOSPADM

## 2018-01-23 RX ORDER — LABETALOL HYDROCHLORIDE 5 MG/ML
10 INJECTION, SOLUTION INTRAVENOUS
Status: DISCONTINUED | OUTPATIENT
Start: 2018-01-23 | End: 2018-01-24 | Stop reason: HOSPADM

## 2018-01-23 RX ORDER — MIDAZOLAM HYDROCHLORIDE 1 MG/ML
INJECTION INTRAMUSCULAR; INTRAVENOUS AS NEEDED
Status: DISCONTINUED | OUTPATIENT
Start: 2018-01-23 | End: 2018-01-23 | Stop reason: SURG

## 2018-01-23 RX ORDER — PANTOPRAZOLE SODIUM 40 MG/10ML
40 INJECTION, POWDER, LYOPHILIZED, FOR SOLUTION INTRAVENOUS ONCE
Status: COMPLETED | OUTPATIENT
Start: 2018-01-23 | End: 2018-01-23

## 2018-01-23 RX ORDER — FAMOTIDINE 20 MG/1
20 TABLET, FILM COATED ORAL ONCE
Status: CANCELLED | OUTPATIENT
Start: 2018-01-23 | End: 2018-01-23

## 2018-01-23 RX ORDER — PROPOFOL 10 MG/ML
VIAL (ML) INTRAVENOUS AS NEEDED
Status: DISCONTINUED | OUTPATIENT
Start: 2018-01-23 | End: 2018-01-23 | Stop reason: SURG

## 2018-01-23 RX ORDER — CARVEDILOL 12.5 MG/1
37.5 TABLET ORAL ONCE
Status: COMPLETED | OUTPATIENT
Start: 2018-01-23 | End: 2018-01-23

## 2018-01-23 RX ORDER — PROMETHAZINE HYDROCHLORIDE 25 MG/1
12.5 TABLET ORAL EVERY 6 HOURS PRN
Status: DISCONTINUED | OUTPATIENT
Start: 2018-01-23 | End: 2018-01-24 | Stop reason: HOSPADM

## 2018-01-23 RX ORDER — SODIUM CHLORIDE 0.9 % (FLUSH) 0.9 %
1-10 SYRINGE (ML) INJECTION AS NEEDED
Status: DISCONTINUED | OUTPATIENT
Start: 2018-01-23 | End: 2018-01-24 | Stop reason: HOSPADM

## 2018-01-23 RX ORDER — FAMOTIDINE 10 MG/ML
20 INJECTION, SOLUTION INTRAVENOUS ONCE
Status: CANCELLED | OUTPATIENT
Start: 2018-01-23 | End: 2018-01-23

## 2018-01-23 RX ORDER — NALOXONE HCL 0.4 MG/ML
0.1 VIAL (ML) INJECTION
Status: DISCONTINUED | OUTPATIENT
Start: 2018-01-23 | End: 2018-01-24 | Stop reason: HOSPADM

## 2018-01-23 RX ORDER — PROCHLORPERAZINE MALEATE 5 MG/1
5 TABLET ORAL EVERY 6 HOURS PRN
Status: DISCONTINUED | OUTPATIENT
Start: 2018-01-23 | End: 2018-01-24 | Stop reason: HOSPADM

## 2018-01-23 RX ORDER — MAGNESIUM HYDROXIDE 1200 MG/15ML
LIQUID ORAL AS NEEDED
Status: DISCONTINUED | OUTPATIENT
Start: 2018-01-23 | End: 2018-01-23 | Stop reason: HOSPADM

## 2018-01-23 RX ORDER — SODIUM CHLORIDE AND POTASSIUM CHLORIDE 150; 450 MG/100ML; MG/100ML
100 INJECTION, SOLUTION INTRAVENOUS CONTINUOUS
Status: DISCONTINUED | OUTPATIENT
Start: 2018-01-24 | End: 2018-01-24 | Stop reason: HOSPADM

## 2018-01-23 RX ORDER — ONDANSETRON 2 MG/ML
4 INJECTION INTRAMUSCULAR; INTRAVENOUS EVERY 6 HOURS PRN
Status: DISCONTINUED | OUTPATIENT
Start: 2018-01-23 | End: 2018-01-24 | Stop reason: HOSPADM

## 2018-01-23 RX ORDER — GLYCOPYRROLATE 0.2 MG/ML
INJECTION INTRAMUSCULAR; INTRAVENOUS AS NEEDED
Status: DISCONTINUED | OUTPATIENT
Start: 2018-01-23 | End: 2018-01-23 | Stop reason: SURG

## 2018-01-23 RX ORDER — CARVEDILOL 12.5 MG/1
37.5 TABLET ORAL EVERY 12 HOURS SCHEDULED
Status: DISCONTINUED | OUTPATIENT
Start: 2018-01-23 | End: 2018-01-23

## 2018-01-23 RX ORDER — PANTOPRAZOLE SODIUM 40 MG/10ML
40 INJECTION, POWDER, LYOPHILIZED, FOR SOLUTION INTRAVENOUS
Status: DISCONTINUED | OUTPATIENT
Start: 2018-01-24 | End: 2018-01-24

## 2018-01-23 RX ORDER — LOSARTAN POTASSIUM 50 MG/1
100 TABLET ORAL DAILY
Status: DISCONTINUED | OUTPATIENT
Start: 2018-01-24 | End: 2018-01-24 | Stop reason: HOSPADM

## 2018-01-23 RX ORDER — ONDANSETRON 2 MG/ML
INJECTION INTRAMUSCULAR; INTRAVENOUS AS NEEDED
Status: DISCONTINUED | OUTPATIENT
Start: 2018-01-23 | End: 2018-01-23 | Stop reason: SURG

## 2018-01-23 RX ORDER — CARVEDILOL 12.5 MG/1
37.5 TABLET ORAL EVERY 12 HOURS SCHEDULED
Status: DISCONTINUED | OUTPATIENT
Start: 2018-01-24 | End: 2018-01-24 | Stop reason: HOSPADM

## 2018-01-23 RX ORDER — CEFAZOLIN SODIUM 2 G/100ML
2 INJECTION, SOLUTION INTRAVENOUS
Status: COMPLETED | OUTPATIENT
Start: 2018-01-23 | End: 2018-01-23

## 2018-01-23 RX ORDER — SODIUM CHLORIDE 9 MG/ML
150 INJECTION, SOLUTION INTRAVENOUS CONTINUOUS
Status: DISCONTINUED | OUTPATIENT
Start: 2018-01-23 | End: 2018-01-23 | Stop reason: HOSPADM

## 2018-01-23 RX ORDER — METOCLOPRAMIDE HYDROCHLORIDE 5 MG/ML
10 INJECTION INTRAMUSCULAR; INTRAVENOUS EVERY 6 HOURS PRN
Status: DISCONTINUED | OUTPATIENT
Start: 2018-01-23 | End: 2018-01-24 | Stop reason: HOSPADM

## 2018-01-23 RX ORDER — CYANOCOBALAMIN 1000 UG/ML
1000 INJECTION, SOLUTION INTRAMUSCULAR; SUBCUTANEOUS ONCE
Status: COMPLETED | OUTPATIENT
Start: 2018-01-24 | End: 2018-01-24

## 2018-01-23 RX ORDER — SIMETHICONE 80 MG
80 TABLET,CHEWABLE ORAL 4 TIMES DAILY PRN
Status: DISCONTINUED | OUTPATIENT
Start: 2018-01-23 | End: 2018-01-24 | Stop reason: HOSPADM

## 2018-01-23 RX ORDER — HYDROMORPHONE HYDROCHLORIDE 1 MG/ML
0.5 INJECTION, SOLUTION INTRAMUSCULAR; INTRAVENOUS; SUBCUTANEOUS
Status: DISCONTINUED | OUTPATIENT
Start: 2018-01-23 | End: 2018-01-24 | Stop reason: HOSPADM

## 2018-01-23 RX ORDER — SCOLOPAMINE TRANSDERMAL SYSTEM 1 MG/1
1 PATCH, EXTENDED RELEASE TRANSDERMAL ONCE
Status: DISCONTINUED | OUTPATIENT
Start: 2018-01-23 | End: 2018-01-23

## 2018-01-23 RX ORDER — SODIUM CHLORIDE 9 MG/ML
INJECTION, SOLUTION INTRAVENOUS AS NEEDED
Status: DISCONTINUED | OUTPATIENT
Start: 2018-01-23 | End: 2018-01-23 | Stop reason: HOSPADM

## 2018-01-23 RX ORDER — GABAPENTIN 300 MG/1
300 CAPSULE ORAL 3 TIMES DAILY
Status: DISCONTINUED | OUTPATIENT
Start: 2018-01-24 | End: 2018-01-24 | Stop reason: HOSPADM

## 2018-01-23 RX ORDER — ONDANSETRON 4 MG/1
4 TABLET, FILM COATED ORAL EVERY 6 HOURS PRN
Status: DISCONTINUED | OUTPATIENT
Start: 2018-01-23 | End: 2018-01-24 | Stop reason: HOSPADM

## 2018-01-23 RX ORDER — CLONIDINE HYDROCHLORIDE 0.1 MG/1
0.1 TABLET ORAL EVERY 6 HOURS PRN
Status: DISCONTINUED | OUTPATIENT
Start: 2018-01-23 | End: 2018-01-24 | Stop reason: HOSPADM

## 2018-01-23 RX ORDER — DIPHENHYDRAMINE HYDROCHLORIDE 50 MG/ML
25 INJECTION INTRAMUSCULAR; INTRAVENOUS EVERY 4 HOURS PRN
Status: DISCONTINUED | OUTPATIENT
Start: 2018-01-23 | End: 2018-01-24 | Stop reason: HOSPADM

## 2018-01-23 RX ORDER — HYDROCODONE BITARTRATE AND ACETAMINOPHEN 7.5; 325 MG/1; MG/1
1 TABLET ORAL EVERY 4 HOURS PRN
Status: DISCONTINUED | OUTPATIENT
Start: 2018-01-23 | End: 2018-01-24 | Stop reason: HOSPADM

## 2018-01-23 RX ORDER — CARVEDILOL 12.5 MG/1
25 TABLET ORAL 2 TIMES DAILY WITH MEALS
Status: DISCONTINUED | OUTPATIENT
Start: 2018-01-23 | End: 2018-01-23

## 2018-01-23 RX ORDER — FENTANYL CITRATE 50 UG/ML
INJECTION, SOLUTION INTRAMUSCULAR; INTRAVENOUS AS NEEDED
Status: DISCONTINUED | OUTPATIENT
Start: 2018-01-23 | End: 2018-01-23 | Stop reason: SURG

## 2018-01-23 RX ORDER — ROCURONIUM BROMIDE 10 MG/ML
INJECTION, SOLUTION INTRAVENOUS AS NEEDED
Status: DISCONTINUED | OUTPATIENT
Start: 2018-01-23 | End: 2018-01-23 | Stop reason: SURG

## 2018-01-23 RX ORDER — PROMETHAZINE HYDROCHLORIDE 25 MG/1
25 SUPPOSITORY RECTAL ONCE AS NEEDED
Status: DISCONTINUED | OUTPATIENT
Start: 2018-01-23 | End: 2018-01-23 | Stop reason: HOSPADM

## 2018-01-23 RX ORDER — SODIUM CHLORIDE 0.9 % (FLUSH) 0.9 %
1-10 SYRINGE (ML) INJECTION AS NEEDED
Status: DISCONTINUED | OUTPATIENT
Start: 2018-01-23 | End: 2018-01-23 | Stop reason: HOSPADM

## 2018-01-23 RX ORDER — LIDOCAINE HYDROCHLORIDE 10 MG/ML
INJECTION, SOLUTION INFILTRATION; PERINEURAL AS NEEDED
Status: DISCONTINUED | OUTPATIENT
Start: 2018-01-23 | End: 2018-01-23 | Stop reason: SURG

## 2018-01-23 RX ORDER — LIDOCAINE HYDROCHLORIDE 10 MG/ML
0.5 INJECTION, SOLUTION EPIDURAL; INFILTRATION; INTRACAUDAL; PERINEURAL ONCE AS NEEDED
Status: COMPLETED | OUTPATIENT
Start: 2018-01-23 | End: 2018-01-23

## 2018-01-23 RX ADMIN — MIDAZOLAM HYDROCHLORIDE 2 MG: 1 INJECTION, SOLUTION INTRAMUSCULAR; INTRAVENOUS at 07:51

## 2018-01-23 RX ADMIN — BUPRENORPHINE HYDROCHLORIDE 60 ML: 0.3 INJECTION INTRAMUSCULAR; INTRAVENOUS at 08:01

## 2018-01-23 RX ADMIN — CEFAZOLIN SODIUM 2 G: 2 INJECTION, SOLUTION INTRAVENOUS at 17:04

## 2018-01-23 RX ADMIN — HYDROMORPHONE HYDROCHLORIDE 0.5 MG: 2 INJECTION INTRAMUSCULAR; INTRAVENOUS; SUBCUTANEOUS at 10:14

## 2018-01-23 RX ADMIN — CARVEDILOL 37.5 MG: 12.5 TABLET, FILM COATED ORAL at 17:04

## 2018-01-23 RX ADMIN — CLONIDINE HYDROCHLORIDE 0.1 MG: 0.1 TABLET ORAL at 19:25

## 2018-01-23 RX ADMIN — FENTANYL CITRATE 100 MCG: 50 INJECTION, SOLUTION INTRAMUSCULAR; INTRAVENOUS at 07:55

## 2018-01-23 RX ADMIN — Medication 3 MG: at 09:44

## 2018-01-23 RX ADMIN — FENTANYL CITRATE 50 MCG: 50 INJECTION, SOLUTION INTRAMUSCULAR; INTRAVENOUS at 10:08

## 2018-01-23 RX ADMIN — FENTANYL CITRATE 50 MCG: 50 INJECTION, SOLUTION INTRAMUSCULAR; INTRAVENOUS at 10:36

## 2018-01-23 RX ADMIN — SODIUM CHLORIDE 150 ML/HR: 9 INJECTION, SOLUTION INTRAVENOUS at 07:10

## 2018-01-23 RX ADMIN — LIDOCAINE HYDROCHLORIDE 50 MG: 10 INJECTION, SOLUTION INFILTRATION; PERINEURAL at 07:55

## 2018-01-23 RX ADMIN — CEFAZOLIN SODIUM 2 G: 2 INJECTION, SOLUTION INTRAVENOUS at 07:51

## 2018-01-23 RX ADMIN — SODIUM CHLORIDE: 9 INJECTION, SOLUTION INTRAVENOUS at 07:51

## 2018-01-23 RX ADMIN — HYDROMORPHONE HYDROCHLORIDE 0.5 MG: 2 INJECTION INTRAMUSCULAR; INTRAVENOUS; SUBCUTANEOUS at 13:45

## 2018-01-23 RX ADMIN — ONDANSETRON 4 MG: 2 INJECTION INTRAMUSCULAR; INTRAVENOUS at 09:43

## 2018-01-23 RX ADMIN — SODIUM CHLORIDE, POTASSIUM CHLORIDE, SODIUM LACTATE AND CALCIUM CHLORIDE 150 ML/HR: 600; 310; 30; 20 INJECTION, SOLUTION INTRAVENOUS at 16:51

## 2018-01-23 RX ADMIN — HYDROMORPHONE HYDROCHLORIDE 0.5 MG: 2 INJECTION INTRAMUSCULAR; INTRAVENOUS; SUBCUTANEOUS at 10:21

## 2018-01-23 RX ADMIN — SODIUM CHLORIDE 1000 ML: 9 INJECTION, SOLUTION INTRAVENOUS at 06:40

## 2018-01-23 RX ADMIN — PROPOFOL 25 MCG/KG/MIN: 10 INJECTION, EMULSION INTRAVENOUS at 08:03

## 2018-01-23 RX ADMIN — HYDROMORPHONE HYDROCHLORIDE 0.5 MG: 2 INJECTION INTRAMUSCULAR; INTRAVENOUS; SUBCUTANEOUS at 19:25

## 2018-01-23 RX ADMIN — ROCURONIUM BROMIDE 10 MG: 10 SOLUTION INTRAVENOUS at 09:00

## 2018-01-23 RX ADMIN — PROPOFOL 200 MG: 10 INJECTION, EMULSION INTRAVENOUS at 07:55

## 2018-01-23 RX ADMIN — SODIUM CHLORIDE, POTASSIUM CHLORIDE, SODIUM LACTATE AND CALCIUM CHLORIDE 150 ML/HR: 600; 310; 30; 20 INJECTION, SOLUTION INTRAVENOUS at 11:02

## 2018-01-23 RX ADMIN — PANTOPRAZOLE SODIUM 40 MG: 40 INJECTION, POWDER, FOR SOLUTION INTRAVENOUS at 07:04

## 2018-01-23 RX ADMIN — SCOPALAMINE 1 PATCH: 1 PATCH, EXTENDED RELEASE TRANSDERMAL at 07:04

## 2018-01-23 RX ADMIN — ROCURONIUM BROMIDE 40 MG: 10 SOLUTION INTRAVENOUS at 07:55

## 2018-01-23 RX ADMIN — GLYCOPYRROLATE 0.4 MG: 0.2 INJECTION, SOLUTION INTRAMUSCULAR; INTRAVENOUS at 09:44

## 2018-01-23 RX ADMIN — LIDOCAINE HYDROCHLORIDE 0.3 ML: 10 INJECTION, SOLUTION EPIDURAL; INFILTRATION; INTRACAUDAL; PERINEURAL at 07:04

## 2018-01-23 NOTE — OP NOTE
OPERATIVE REPORT    DATE: 01/23/18    PATIENT: Nichelle Casey    PREOPERATIVE DIAGNOSIS:    1. Obesity with multiple comorbidities  2.  GERD       POSTOPERATIVE DIAGNOSIS:    1. Obesity with multiple comorbidities  2.  Paraesophageal hernia     PROCEDURES PERFORMED:  1. Laparoscopic sleeve gastrectomy (85% subtotal vertical gastrectomy) over a  36-Canadian bougie dilator.   2.  Esophagogastroduodenoscopy  3.  Laparoscopic paraesophageal hiatal hernia repair     SURGEON:  Millie Mccann M.D.    ANESTHESIA:  General endotracheal with LEANDER block    ESTIMATED BLOOD LOSS:   30 mL    FLUIDS:  Crystalloids.    SPECIMENS:  Subtotal gastrectomy.    DRAINS:  None.    COUNTS:  Correct.    COMPLICATIONS:  None.    FINDINGS: small liver, small amount of bleeding from splenic capsule    INDICATIONS:   Nichelle Casey is a 47 y.o. year old female with morbid obesity and associated comorbidities who presents for elective laparoscopic sleeve gastrectomy. The patient has has undergone our extensive preoperative education, teaching, and consent process. Everything is in order and they wish to proceed.     DESCRIPTION OF PROCEDURE:   The patient was brought to the operating room and placed supine upon the operating room table. SCDs were placed. The patient underwent uneventful general endotracheal anesthesia and bilateral LEANDER blocks per the anesthesiology staff.  She received subcutaneous Lovenox and was given 2 g Ancef. The abdomen was prepped with ChloraPrep and draped in the usual sterile fashion. An Ioban was used as well.  Timeout was performed.       A small transverse incision was made a few centimeters above and to the left of the umbilicus and the peritoneal cavity entered under direct camera visualization using an 5 mm 0° laparoscope and an Optiview trocar. The abdomen was then insufflated to a pressure of 16 mmHg with CO2 gas. Exploratory laparoscopy revealed no evidence of injury from the entrance technique. The  liver had a uniform capsule and was small in size without evidence of gross hepatomegaly or fibrosis.  A 5 mm port was placed in the right mid abdomen laterally and a 15 mm port was placed just left of the umbilicus.  An 5 mm port was placed lateral and to the left to the first port and a 5 mm port was placed a handsbreadth lateral to that on the left.  A Melissa liver retractor was placed through a small subxiphoid stab incision and the the left lobe of the liver was elevated.       The stomach was decompressed with an orogastric tube, which was then withdrawn back into the esophagus. The greater curvature vessels were taken down with a Harmonic scalpel beginning at the midpoint of the stomach and continued up to the angle of His, including the short gastrics. The left cristi was completely exposed and there was a small paraesophageal hiatal hernia here with some fat herniating into the mediastinum, and also visible anteriorly This was photodocumented. The GE junction fat pad was elevated to make a clear landing zone for the stapler later. The greater curvature vessels were taken down with the Harmonic scalpel extending distally within 3 cm of the pylorus. Filmy adhesions to the stomach were taken down posteriorly.    I incised the hernia sac from the left cristi of the diaphragm.  I incised the phrenoesophageal ligament with Harmonic scalpel. The pars flaccida was opened and the right cristi was exposed.  Care was taken to preserve a small neurovascular bundle running through the pars flaccida, smaller than expected for replaced hepatic vessel but I preserved it nonetheless.  I incised the hernia sac from the right cristi.  An instrument was passed under the esophagus at the base of the hiatus and brought easily out the other side.  A penrose drain was placed around the esophagus for retraction.  The esophagus was mobilized into the abdomen 5 cm.  The vagus nerves were identified and preserved.  A posterior cruroplasty  was performed with three 0 silk stitches.  The crura came together without tension and repair was photodocumented.  The penrose was removed.  The time to repair the hernia was documented and was 21 minutes.      The OGT was removed, and the 36 K[place] bougie dilator was passed transorally and positioned along the lesser curvature of the stomach with the tip at the pylorus. Using the bougie as template, a sleeve gastrectomy was performed with an articulating 60 mm Heflin stapler bolstered by single Sushila-strips. The first load was black, and the rest of the loads (5) were green.The bougie was removed before the last staple load was fired. Care was taken to stay 1 cm away from the esophagus to prevent any esophagus fibers from being divided. The staple line was examined. There were several points of bleeding that required cautery for hemostasis. The gastrectomy specimen was removed through the 15 mm port site in a specimen bag. The specimen was later examined, and the staples were well-formed. Palpation of the specimen revealed no masses. The staple line of the sleeve gastrectomy revealed staples that were well-formed.      The flexible endoscope was passed transorally down to the pylorus easily. The sleeve extended to within 2-3 cm proximal to the pylorus and was hemostatic. The sleeve was not narrow or twisted. There was no persistent hiatal hernia or distal esophagitis. The rest of the esophagus was normal. The scope was removed. The leak test was normal.    I rescrubbed and suctioned the irrigation fluid from the upper abdomen, making sure it was dry. The staple line on the stomach required some more hemostasis with cautery. There was some bleeding from a tiny splenic capsular tear at the upper pole of the spleen and this was treated with FloSeal with good hemostasis.  The staple line was treated with 4 ml of aerosolized Tisseel fibrin glue.  The liver retractor was removed easily.      The 15 mm port was removed  under direct visualization and there was no bleeding. This incision was closed with an 0-vicryl transfascial suture using a suture passer under direct visualization in a horizontal mattress fashion. All other ports were removed and then replaced under direct visualization and all of these were hemostatic. The instruments were removed and the abdomen was desufflated. The ports were removed. A 2-0 vicryl was used to close the subcutaneous tissue in the 15 mm port site. 3-0 monocryl plus was used to close skin incisions with interrupted sutures. Skin Affix skin glue was placed. The patient was awakened and taken to recovery in good condition, having tolerated the procedure well. All sponge, needle, and instrument counts were correct.

## 2018-01-23 NOTE — ANESTHESIA PROCEDURE NOTES
Airway  Urgency: elective    Airway not difficult    General Information and Staff    Patient location during procedure: OR  CRNA: BALJIT MCKAY    Indications and Patient Condition  Indications for airway management: airway protection    Preoxygenated: yes  MILS not maintained throughout  Mask difficulty assessment: 2 - vent by mask + OA or adjuvant +/- NMBA    Final Airway Details  Final airway type: endotracheal airway      Successful airway: ETT  Cuffed: yes   Successful intubation technique: direct laryngoscopy  Facilitating devices/methods: intubating stylet  Endotracheal tube insertion site: oral  Blade: Patience  Blade size: #3  ETT size: 7.0 mm  Cormack-Lehane Classification: grade I - full view of glottis  Placement verified by: chest auscultation and capnometry   Measured from: lips  ETT to lips (cm): 20  Number of attempts at approach: 1    Additional Comments  Negative epigastric sounds, Breath sound equal bilaterally with symmetric chest rise and fall, lips and teeth as pre op

## 2018-01-23 NOTE — ANESTHESIA POSTPROCEDURE EVALUATION
Patient: Nichelle Casey    Procedure Summary     Date Anesthesia Start Anesthesia Stop Room / Location    01/23/18 0751  [facility] / [facility]       Procedure Diagnosis Surgeon Provider    GASTRIC SLEEVE LAPAROSCOPIC AND paraesophageal HIATAL HERNIA REPAIR (N/A Abdomen); PARAESOPHAGEAL HERNIA REPAIR LAPAROSCOPIC (N/A Abdomen) Obesity, Class II, BMI 35-39.9  (Obesity, Class II, BMI 35-39.9 [E66.9]) MD Mikie Tang MD          Anesthesia Type: general  Last vitals  BP   139/96 (01/23/18 0654)   Temp   98.2 °F (36.8 °C) (01/23/18 0654)   Pulse   82 (01/23/18 0654)   Resp   20 (01/23/18 0654)     SpO2   93 % (01/23/18 0654)     Post Anesthesia Care and Evaluation    Patient location during evaluation: PACU  Patient participation: complete - patient participated  Level of consciousness: awake and alert  Pain score: 0  Pain management: adequate  Airway patency: patent  Anesthetic complications: No anesthetic complications  PONV Status: none  Cardiovascular status: hemodynamically stable and acceptable  Respiratory status: nonlabored ventilation, acceptable and nasal cannula  Hydration status: acceptable    Comments: /100 (BP Location: Right arm, Patient Position: Lying)  Pulse 58  Temp 98.2 °F (36.8 °C) (Temporal Artery )   Resp 20  SpO2 93%

## 2018-01-23 NOTE — BRIEF OP NOTE
GASTRIC SLEEVE LAPAROSCOPIC  Progress Note    Nichelle Casey  1/23/2018    Pre-op Diagnosis:   Obesity, Class II, BMI 35-39.9 [E66.9]       Post-Op Diagnosis Codes:     * Obesity, Class II, BMI 35-39.9 [E66.9]    Procedure/CPT® Codes:      Procedure(s):  GASTRIC SLEEVE LAPAROSCOPIC    Surgeon(s):  Millie Mccann MD    Anesthesia: General with Block    Staff:   Circulator: Randa Banks RN  Scrub Person: Aditi Mariano  Nursing Assistant: Alesha Hennessy    Estimated Blood Loss: 30mL    Urine Voided: * No values recorded between 1/23/2018 12:00 AM and 1/23/2018  8:05 AM *    Specimens:                A: subtotal gastrectomy      Drains:           Findings: small liver, small amount of bleeding from splenic capsule    Complications: none immediate      Millie Mccann MD     Date: 1/23/2018  Time: 8:05 AM

## 2018-01-23 NOTE — ANESTHESIA PREPROCEDURE EVALUATION
Anesthesia Evaluation     Patient summary reviewed and Nursing notes reviewed   NPO Solid Status: > 8 hours  NPO Liquid Status: > 8 hours     Airway   Mallampati: I  TM distance: >3 FB  Neck ROM: full  no difficulty expected  Dental      Pulmonary    (+) shortness of breath (baseline ), recent URI (last month ),   Cardiovascular     ECG reviewed    (+) hypertension,   (-) past MI, CAD, angina, cardiac stentsHyperlipidemia: NO RTX.    ROS comment: EKG NSR  GXT neg verbal report from pt [facility]     Neuro/Psych  (-) seizures, TIA, CVA  GI/Hepatic/Renal/Endo    (+) morbid obesity, liver disease (fatty liver ),   (-)  obesity, no renal disease, diabetes, hypothyroidism    Musculoskeletal     Abdominal    Substance History      OB/GYN          Other   (+) arthritis                                             Anesthesia Plan    ASA 2     general   (TAPblocks , Propofol infusion,  Opiate sparing  )  intravenous induction   Anesthetic plan and risks discussed with patient.    Plan discussed with CRNA.

## 2018-01-23 NOTE — PLAN OF CARE
Problem: Patient Care Overview (Adult)  Goal: Adult Individualization and Mutuality  Outcome: Ongoing (interventions implemented as appropriate)   01/23/18 1529   Individualization   Patient Specific Goals Pain < 4   Patient Specific Interventions Assess pain q2h & prn       Problem: Perioperative Period (Adult)  Goal: Signs and Symptoms of Listed Potential Problems Will be Absent or Manageable (Perioperative Period)  Outcome: Ongoing (interventions implemented as appropriate)   01/23/18 1529   Perioperative Period   Problems Assessed (Perioperative Period) pain   Problems Present (Perioperative Period) pain       Problem: Bariatric Surgery (Open/Laparoscopic) (Adult,Pediatric)  Goal: Signs and Symptoms of Listed Potential Problems Will be Absent or Manageable (Bariatric Surgery)  Outcome: Ongoing (interventions implemented as appropriate)   01/23/18 1529   Bariatric Surgery (Open/Laparoscopic)   Problems Assessed (Bariatric Surgery) situational response   Problems Present (Bariatric Surgery) situational response

## 2018-01-23 NOTE — H&P
[facility] BARIATRIC SURGERY  [address]  [facility] [address]  [phone]        Patient  Name:  Nichelle Casey  :  1970        Date of Visit: 2018        Chief Complaint:  weight gain; unable to maintain weight loss     History of Present Illness:  Nichelle Casey is a 47 y.o. female who presents today for evaluation, education and consultation regarding weight loss surgery.       Nichelle Casey is a 47 y.o. female who presents today for evaluation, education and consultation regarding weight loss surgery.      Nichelle returns for last visit today. She has been overweight for many years with may failed weight loss attempts.  She now has comorbidities of HTN, HLD, chronic back pain, dyspnea on exertion, fatigue, and OA, and has decided to pursue weight loss surgery.  There has been no change in medical history.  Since last visit, she has obtained Cardiac clearance for surgery, and no further cardiac workup was needed.    She has attended >90 minutes class today and listened attentively, asking appropriate questions that demonstrated her understanding adherence to dietary/medication/lifestyle/vitamin recommendations.      Review of data:     OSORIO: Norco 5, gabapentin  CBC: nl  CMP: K 3.2 (on Hctz)  EGD: PQ, 17 39 cm, no HH, De reflux, mild chronic gastritis  HP negative  Cardiac clearance: 2017, cleared  Echo: n/a  Stress test: n/a  PFTs: n/a  Pulm clearance: n/a  EKG: NSR, nonspecific T wave abnormality  CXR: nl        Last tobacco: n/a  Last NSAIDs: 2 weeks ago  Last ASA: n/a  Last steroids: n/a  Last hormones: n/a         Medical History         Past Medical History:   Diagnosis Date   • Chronic back pain       herniated disc w/ sciatica   • Dyspepsia     • Dyspnea on exertion     • Elevated LFTs     • Fatigue     • Heartburn       prn OTC meds   • Hormone replacement therapy (HRT)       shell-menopausal   • HTN (hypertension)     •  Hypertriglyceridemia     • Obesity     • Osteoarthritis            Surgical History          Past Surgical History:   Procedure Laterality Date   • TUBAL ABDOMINAL LIGATION   1990   • VAGINAL HYSTERECTOMY   2009     partial - uterine fibroids, laparoscopic   • WISDOM TOOTH EXTRACTION   1993                 Allergies   Allergen Reactions   • Prednisone Shortness Of Breath         Current Outpatient Prescriptions:   •  carvedilol (COREG) 25 MG tablet, Take 25 mg by mouth 2 (Two) Times a Day With Meals. 1.5 bid, Disp: , Rfl:   •  gabapentin (NEURONTIN) 300 MG capsule, 300 mg 3 (Three) Times a Day., Disp: , Rfl:   •  losartan-hydrochlorothiazide (HYZAAR) 100-25 MG per tablet, Take 1 tablet by mouth Daily., Disp: , Rfl:   •  triamterene-hydrochlorothiazide (MAXZIDE-25) 37.5-25 MG per tablet, Take 1 tablet by mouth Daily., Disp: , Rfl:   •  HYDROcodone-acetaminophen (NORCO) 5-325 MG per tablet, Take 1 tablet by mouth Every 6 (Six) Hours As Needed., Disp: , Rfl:       Social History    Social History            Social History   • Marital status:        Spouse name: Devang Casey   • Number of children: 2   • Years of education: 12th grade           Occupational History   •                Social History Main Topics   • Smoking status: Never Smoker   • Smokeless tobacco: Never Used   • Alcohol use No   • Drug use: No   • Sexual activity: Yes       Partners: Male         Comment:             Other Topics Concern   • Not on file      Social History Narrative     Lives in [place], [place] w/ .   for [employer].                 Family History   Problem Relation Age of Onset   • Hypertension Mother     • Diabetes Father     • Hypertension Father     • Heart disease Father     • Prostate cancer Father     • Hypertension Sister     • Hypertension Maternal Grandmother     • Diabetes Maternal Grandmother     • Lung cancer Maternal Grandfather     • Emphysema Paternal  Grandmother     • Heart attack Paternal Grandfather           Review of Systems:  Constitutional:  The patient reports fatigue, weight gain and denies fevers and chills.  Cardiovascular:  The patient reports HTN and denies heart disease and DVT.  Respiratory:  The patient reports none and denies asthma, apnea and PE.  Gastrointestinal:  The patient reports heartburn and denies pancreatitis, liver disease and IBS.  Genitourinary:  The patient denies renal insufficiency.    Musculoskeletal:  The patient reports joint pain, arthritis and denies fibromyalgia.  Neurological:  The patient reports none and denies seizure and stroke.  Psychiatric:  The patient reports none and denies anxiety, depression and bipolar disorder.  Endocrine:  The patient reports none and denies diabetes, thyroid disease and gout.  Hematologic:  The patient reports none and denies anemia and bleeding disorder.  Skin:  The patient denies MRSA.     Physical Exam:  Vital Signs:  Weight: 112 kg (246 lb 8 oz)   Body mass index is 36.94 kg/(m^2).  Temp: 98.1 °F (36.7 °C)   Heart Rate: 77   BP: 135/89      Physical Exam   Constitutional: She is oriented to person, place, and time. She appears well-developed and well-nourished. No distress.   HENT:   Head: Normocephalic and atraumatic.   Mouth/Throat: No oropharyngeal exudate.   Eyes: Conjunctivae and EOM are normal. Pupils are equal, round, and reactive to light.   Neck: Normal range of motion. Neck supple. No tracheal deviation present. No thyromegaly present.   Cardiovascular: Normal rate, regular rhythm and normal heart sounds.    Pulmonary/Chest: Effort normal and breath sounds normal.   Abdominal: Soft. She exhibits no distension. There is no tenderness.   umbi hernia, small umbilical port site incision   Musculoskeletal: Normal range of motion. She exhibits no edema or deformity.   Neurological: She is alert and oriented to person, place, and time. No cranial nerve deficit.   Skin: Skin is warm  and dry. No rash noted. She is not diaphoretic. No erythema.   Psychiatric: She has a normal mood and affect. Her behavior is normal. Judgment and thought content normal.             Patient Active Problem List   Diagnosis   • Obesity   • Fatigue   • Dyspepsia   • Dyspnea on exertion   • Hormone replacement therapy (HRT)   • HTN (hypertension)   • Hypertriglyceridemia   • Chronic back pain   • Heartburn   • Elevated LFTs   • Osteoarthritis   • Obesity, Class II, BMI 35-39.9         Assessment:     Nichelle Casey is a 47 y.o. year old female with medically complicated obesity.     Weight loss surgery is deemed medically necessary given the following obesity related comorbidities including hypertension, dyslipidemia, osteoarthritis, back pain and GERD with current Weight: 112 kg (246 lb 8 oz) and Body mass index is 36.94 kg/(m^2)..     Patient is aware that surgery is a tool, and that weight loss is not guaranteed but only seen in the context of appropriate use, follow up and exercise.     The patient was present for an approximately a 2.5 hour discussion of the purpose of weight loss surgery, how WLS is a tool to assist in achieving weight loss goals, the most common complications and how best to avoid them, and the strategies for short and long term weight loss.  Ample opportunity to discuss questions was available both in group and during the time of individual examination.     I reviewed all available preop labs, Xrays, tests, clearances, etc and signed off on these in the record.  All of this in addition to the patient's unique history and exam has been taken into consideration in determining their appropriate candidacy for weight loss surgery.     Complications  of laparoscopic/possible robotic gastric sleeve were discussed. The patient is well aware of the potential complications of surgery that include but not limited to bleeding, infections, deep venous thrombosis, pulmonary embolism, pulmonary  "complications such as pneumonia, cardiac events, hernias, small bowel obstruction, damage to the spleen or other organs, bowel injury, disfiguring scars, failure to lose weight, need for additional surgery, conversion to an open procedure, and death. Patient is also aware of complications which apply in this particular procedure that can include but are not limited to a \"leak\" at the staple line which in some instances may require conversion to gastric bypass.     The patient is aware if a hiatal hernia is encountered, it likely will be repaired.  R/B/A Rx to hiatal hernia repair were discussed as outlined in our long consent form.  Briefly risks in addition to those for LSG include recurrent hernia, LACIE, dysphagia, esophageal injury, pneumothorax, injury to the vagus nerves, injury to the thoracic duct, aorta or vena cava.     Greater than 3 minutes was spent with the patient discussing avoiding all tobacco products and second hand smoke at least 2 weeks pre-operatively and 6 weeks post-operatively to minimize the risk of sleeve leak.  This included discussing the importance of avoiding even secondhand smoke as the risk of leak is increased.  Examples discussed:  I made it very clear that the patient understands they should avoid even riding in a car where someone has previously smoked in the last 2 weeks, living in a house where someone smokes (even if it's in a separate room/patio/attached garage, etc.) we discussed that they should not have a conversation with a group of people who are smoking even if it's outside.  They can be around wood burning fires and barbecue.  I told them I do not know if marijuana has a same effects but my overall recommendation is to avoid it for 2 weeks prior in 6 weeks after surgery.  They also are aware that nicotine may also increase the risk of leak and I strongly encouraged him to avoid that as well for 2 weeks prior in 6 weeks after surgery.     Discussed the risks, benefits and " alternative therapies at great length as outlined in our extensive consent forms, consent videos, and educational teaching process under the direction of the center's .     A copy of the patient's signed informed consent is on file.     Plan:  Laparoscopic sleeve gastrectomy            Millie Mccann MD

## 2018-01-23 NOTE — ANESTHESIA PROCEDURE NOTES
Peripheral Block    Patient location during procedure: OR  Reason for block: at surgeon's request and post-op pain management  Performed by  CRNA: CRISTI KEYS  Preanesthetic Checklist  Completed: patient identified, site marked, surgical consent, pre-op evaluation, timeout performed, IV checked, risks and benefits discussed and monitors and equipment checked  Prep:  Pt Position: supine  Sterile barriers:cap, gloves, sterile barriers and mask  Prep: ChloraPrep  Patient monitoring: blood pressure monitoring, continuous pulse oximetry and EKG  Procedure  Sedation:yes  Performed under: general  Guidance:ultrasound guided  Images:still images obtained    Laterality:Bilateral  Block Type:TAP  Injection Technique:single-shot  Needle Type:short-bevel and echogenic  Needle Gauge:20 G    Medications  Comment:Block Injection:  LA dose divided between Right and Left block       Adjuncts: Buprenex 0.3mg (Per total volume of LA)  Local Injected:bupivacaine 0.25% Local Amount Injected:60mL  Post Assessment  Injection Assessment: negative aspiration for heme, incremental injection and no paresthesia on injection  Patient Tolerance:comfortable throughout block  Complications:no  Additional Notes      Under Ultrasound guidance, a BBraun 4inch 360 degree needle was advanced with Normal Saline hydro dissection of tissue.  The Internal Oblique and Transversus Abdominus muscles where visualized.  At or before the aponeurosis of Internal Oblique, local anesthetic spread was visualized in the Transversus Abdominus Plane. Injection was made incrementally with aspiration every 5 mls.  There was no  intravascular injection,  injection pressure was normal, there was no neural injection, and the procedure was completed without difficulty.  Thank You.

## 2018-01-24 ENCOUNTER — APPOINTMENT (OUTPATIENT)
Dept: GENERAL RADIOLOGY | Facility: HOSPITAL | Age: 48
End: 2018-01-24
Attending: SURGERY

## 2018-01-24 VITALS
BODY MASS INDEX: 36.51 KG/M2 | DIASTOLIC BLOOD PRESSURE: 98 MMHG | RESPIRATION RATE: 16 BRPM | OXYGEN SATURATION: 94 % | HEIGHT: 69 IN | HEART RATE: 71 BPM | TEMPERATURE: 97.2 F | SYSTOLIC BLOOD PRESSURE: 142 MMHG | WEIGHT: 246.5 LBS

## 2018-01-24 LAB
ALBUMIN SERPL-MCNC: 3.5 G/DL (ref 3.2–4.8)
ALBUMIN/GLOB SERPL: 1.5 G/DL (ref 1.5–2.5)
ALP SERPL-CCNC: 54 U/L (ref 25–100)
ALT SERPL W P-5'-P-CCNC: 30 U/L (ref 7–40)
ANION GAP SERPL CALCULATED.3IONS-SCNC: 6 MMOL/L (ref 3–11)
AST SERPL-CCNC: 23 U/L (ref 0–33)
BASOPHILS # BLD AUTO: 0.01 10*3/MM3 (ref 0–0.2)
BASOPHILS NFR BLD AUTO: 0.1 % (ref 0–1)
BILIRUB SERPL-MCNC: 0.8 MG/DL (ref 0.3–1.2)
BUN BLD-MCNC: 10 MG/DL (ref 9–23)
BUN/CREAT SERPL: 16.7 (ref 7–25)
CALCIUM SPEC-SCNC: 8.5 MG/DL (ref 8.7–10.4)
CHLORIDE SERPL-SCNC: 104 MMOL/L (ref 99–109)
CO2 SERPL-SCNC: 25 MMOL/L (ref 20–31)
CREAT BLD-MCNC: 0.6 MG/DL (ref 0.6–1.3)
CYTO UR: NORMAL
DEPRECATED RDW RBC AUTO: 42.2 FL (ref 37–54)
EOSINOPHIL # BLD AUTO: 0.02 10*3/MM3 (ref 0–0.3)
EOSINOPHIL NFR BLD AUTO: 0.2 % (ref 0–3)
ERYTHROCYTE [DISTWIDTH] IN BLOOD BY AUTOMATED COUNT: 14.5 % (ref 11.3–14.5)
GFR SERPL CREATININE-BSD FRML MDRD: 107 ML/MIN/1.73
GLOBULIN UR ELPH-MCNC: 2.3 GM/DL
GLUCOSE BLD-MCNC: 106 MG/DL (ref 70–100)
HCT VFR BLD AUTO: 36 % (ref 34.5–44)
HGB BLD-MCNC: 11.7 G/DL (ref 11.5–15.5)
IMM GRANULOCYTES # BLD: 0.01 10*3/MM3 (ref 0–0.03)
IMM GRANULOCYTES NFR BLD: 0.1 % (ref 0–0.6)
IRON 24H UR-MRATE: 17 MCG/DL (ref 50–175)
LAB AP CASE REPORT: NORMAL
LAB AP CLINICAL INFORMATION: NORMAL
LYMPHOCYTES # BLD AUTO: 1.09 10*3/MM3 (ref 0.6–4.8)
LYMPHOCYTES NFR BLD AUTO: 10.3 % (ref 24–44)
Lab: NORMAL
MCH RBC QN AUTO: 26.1 PG (ref 27–31)
MCHC RBC AUTO-ENTMCNC: 32.5 G/DL (ref 32–36)
MCV RBC AUTO: 80.2 FL (ref 80–99)
MONOCYTES # BLD AUTO: 0.85 10*3/MM3 (ref 0–1)
MONOCYTES NFR BLD AUTO: 8 % (ref 0–12)
NEUTROPHILS # BLD AUTO: 8.64 10*3/MM3 (ref 1.5–8.3)
NEUTROPHILS NFR BLD AUTO: 81.3 % (ref 41–71)
PATH REPORT.FINAL DX SPEC: NORMAL
PATH REPORT.GROSS SPEC: NORMAL
PLATELET # BLD AUTO: 195 10*3/MM3 (ref 150–450)
PMV BLD AUTO: 9.6 FL (ref 6–12)
POTASSIUM BLD-SCNC: 3.3 MMOL/L (ref 3.5–5.5)
PROT SERPL-MCNC: 5.8 G/DL (ref 5.7–8.2)
RBC # BLD AUTO: 4.49 10*6/MM3 (ref 3.89–5.14)
SODIUM BLD-SCNC: 135 MMOL/L (ref 132–146)
WBC NRBC COR # BLD: 10.62 10*3/MM3 (ref 3.5–10.8)

## 2018-01-24 PROCEDURE — 85025 COMPLETE CBC W/AUTO DIFF WBC: CPT | Performed by: SURGERY

## 2018-01-24 PROCEDURE — 25010000002 ONDANSETRON PER 1 MG: Performed by: SURGERY

## 2018-01-24 PROCEDURE — 80053 COMPREHEN METABOLIC PANEL: CPT | Performed by: SURGERY

## 2018-01-24 PROCEDURE — 0 DIATRIZOATE MEGLUMINE & SODIUM PER 1 ML: Performed by: SURGERY

## 2018-01-24 PROCEDURE — 25010000002 NA FERRIC GLUC CPLX PER 12.5 MG: Performed by: SURGERY

## 2018-01-24 PROCEDURE — 25010000002 THIAMINE PER 100 MG: Performed by: SURGERY

## 2018-01-24 PROCEDURE — 25010000002 PYRIDOXINE PER 100 MG: Performed by: SURGERY

## 2018-01-24 PROCEDURE — 83540 ASSAY OF IRON: CPT | Performed by: SURGERY

## 2018-01-24 PROCEDURE — 25010000002 CYANOCOBALAMIN PER 1000 MCG: Performed by: SURGERY

## 2018-01-24 PROCEDURE — 99024 POSTOP FOLLOW-UP VISIT: CPT | Performed by: SURGERY

## 2018-01-24 PROCEDURE — 25010000002 ENOXAPARIN PER 10 MG: Performed by: SURGERY

## 2018-01-24 PROCEDURE — 94799 UNLISTED PULMONARY SVC/PX: CPT

## 2018-01-24 PROCEDURE — S0260 H&P FOR SURGERY: HCPCS | Performed by: SURGERY

## 2018-01-24 PROCEDURE — 25010000003 CEFAZOLIN IN DEXTROSE 2-4 GM/100ML-% SOLUTION: Performed by: SURGERY

## 2018-01-24 PROCEDURE — 74241: CPT

## 2018-01-24 RX ORDER — ONDANSETRON 4 MG/1
4 TABLET, ORALLY DISINTEGRATING ORAL EVERY 8 HOURS PRN
Qty: 20 TABLET | Refills: 0 | Status: SHIPPED | OUTPATIENT
Start: 2018-01-24 | End: 2018-02-01

## 2018-01-24 RX ORDER — POTASSIUM CHLORIDE 750 MG/1
40 CAPSULE, EXTENDED RELEASE ORAL AS NEEDED
Status: DISCONTINUED | OUTPATIENT
Start: 2018-01-24 | End: 2018-01-24 | Stop reason: HOSPADM

## 2018-01-24 RX ORDER — POTASSIUM CHLORIDE 1.5 G/1.77G
60 POWDER, FOR SOLUTION ORAL ONCE
Status: COMPLETED | OUTPATIENT
Start: 2018-01-24 | End: 2018-01-24

## 2018-01-24 RX ORDER — POTASSIUM CHLORIDE 1.5 G/1.77G
40 POWDER, FOR SOLUTION ORAL AS NEEDED
Status: DISCONTINUED | OUTPATIENT
Start: 2018-01-24 | End: 2018-01-24 | Stop reason: HOSPADM

## 2018-01-24 RX ORDER — HYDROCODONE BITARTRATE AND ACETAMINOPHEN 7.5; 325 MG/1; MG/1
1 TABLET ORAL EVERY 6 HOURS PRN
Qty: 30 TABLET | Refills: 0 | Status: SHIPPED | OUTPATIENT
Start: 2018-01-24 | End: 2018-02-01

## 2018-01-24 RX ORDER — PANTOPRAZOLE SODIUM 40 MG/1
40 TABLET, DELAYED RELEASE ORAL
Status: DISCONTINUED | OUTPATIENT
Start: 2018-01-25 | End: 2018-01-24 | Stop reason: HOSPADM

## 2018-01-24 RX ORDER — OMEPRAZOLE 40 MG/1
40 CAPSULE, DELAYED RELEASE ORAL DAILY
Qty: 60 CAPSULE | Refills: 0 | Status: SHIPPED | OUTPATIENT
Start: 2018-01-24 | End: 2018-04-23

## 2018-01-24 RX ADMIN — SODIUM CHLORIDE 125 MG: 0.9 INJECTION, SOLUTION INTRAVENOUS at 12:32

## 2018-01-24 RX ADMIN — HYDROCODONE BITARTRATE AND ACETAMINOPHEN 1 TABLET: 7.5; 325 TABLET ORAL at 18:03

## 2018-01-24 RX ADMIN — LABETALOL HYDROCHLORIDE 10 MG: 5 INJECTION INTRAVENOUS at 06:35

## 2018-01-24 RX ADMIN — CARVEDILOL 37.5 MG: 12.5 TABLET, FILM COATED ORAL at 08:04

## 2018-01-24 RX ADMIN — LOSARTAN POTASSIUM 100 MG: 50 TABLET ORAL at 08:05

## 2018-01-24 RX ADMIN — CYANOCOBALAMIN 1000 MCG: 1000 INJECTION, SOLUTION INTRAMUSCULAR; SUBCUTANEOUS at 08:04

## 2018-01-24 RX ADMIN — ONDANSETRON 4 MG: 4 TABLET, FILM COATED ORAL at 10:15

## 2018-01-24 RX ADMIN — HYDROCODONE BITARTRATE AND ACETAMINOPHEN 1 TABLET: 7.5; 325 TABLET ORAL at 10:15

## 2018-01-24 RX ADMIN — Medication 30 ML: at 09:53

## 2018-01-24 RX ADMIN — ONDANSETRON 4 MG: 4 TABLET, FILM COATED ORAL at 18:03

## 2018-01-24 RX ADMIN — CEFAZOLIN SODIUM 2 G: 2 INJECTION, SOLUTION INTRAVENOUS at 00:00

## 2018-01-24 RX ADMIN — ONDANSETRON 4 MG: 2 INJECTION, SOLUTION INTRAMUSCULAR; INTRAVENOUS at 05:54

## 2018-01-24 RX ADMIN — ENOXAPARIN SODIUM 40 MG: 40 INJECTION SUBCUTANEOUS at 08:04

## 2018-01-24 RX ADMIN — POTASSIUM CHLORIDE 60 MEQ: 1.5 POWDER, FOR SOLUTION ORAL at 10:15

## 2018-01-24 RX ADMIN — FOLIC ACID 100 ML/HR: 5 INJECTION, SOLUTION INTRAMUSCULAR; INTRAVENOUS; SUBCUTANEOUS at 08:04

## 2018-01-24 RX ADMIN — CLONIDINE HYDROCHLORIDE 0.1 MG: 0.1 TABLET ORAL at 04:47

## 2018-01-24 RX ADMIN — PANTOPRAZOLE SODIUM 40 MG: 40 INJECTION, POWDER, FOR SOLUTION INTRAVENOUS at 05:46

## 2018-01-24 NOTE — PLAN OF CARE
Problem: Patient Care Overview (Adult)  Goal: Adult Individualization and Mutuality  Outcome: Ongoing (interventions implemented as appropriate)      Problem: Bariatric Surgery (Open/Laparoscopic) (Adult,Pediatric)  Goal: Signs and Symptoms of Listed Potential Problems Will be Absent or Manageable (Bariatric Surgery)  Outcome: Ongoing (interventions implemented as appropriate)   01/24/18 1640   Bariatric Surgery (Open/Laparoscopic)   Problems Assessed (Bariatric Surgery) pain   Problems Present (Bariatric Surgery) none

## 2018-01-24 NOTE — PROGRESS NOTES
"Bariatric Surgery Progress Note:      Chief Complaint:  POD #1    Subjective     Interval History:  Doing well.  No complaints.  Pain controlled.  Denies N/V.  No fevers.  Ambulating.  Voiding.  IS 1750.  Excited to go home.    Objective     Vital Signs  Blood pressure 138/85, pulse 82, temperature 98.2 °F (36.8 °C), temperature source Oral, resp. rate 16, height 174 cm (68.5\"), weight 112 kg (246 lb 8 oz), SpO2 94 %.      Intake/Output Summary (Last 24 hours) at 01/24/18 1432  Last data filed at 01/24/18 1300   Gross per 24 hour   Intake                0 ml   Output             2750 ml   Net            -2750 ml       Physical Exam:  General: Alert, NAD  Lungs: Clear  Heart: RRR  Abdomen: soft, appropriate, incisions clean and dry.  + BS  Extremities: warm, (+) SCDs       Labs:  Lab Results (last 24 hours)     Procedure Component Value Units Date/Time    CBC & Differential [[id number]] Collected:  01/24/18 0609    Specimen:  Blood Updated:  01/24/18 0726    Narrative:       The following orders were created for panel order CBC & Differential.  Procedure                               Abnormality         Status                     ---------                               -----------         ------                     CBC Auto Differential[[id number]]        Abnormal            Final result                 Please view results for these tests on the individual orders.    CBC Auto Differential [[id number]]  (Abnormal) Collected:  01/24/18 0609    Specimen:  Blood Updated:  01/24/18 0726     WBC 10.62 10*3/mm3      RBC 4.49 10*6/mm3      Hemoglobin 11.7 g/dL      Hematocrit 36.0 %      MCV 80.2 fL      MCH 26.1 (L) pg      MCHC 32.5 g/dL      RDW 14.5 %      RDW-SD 42.2 fl      MPV 9.6 fL      Platelets 195 10*3/mm3      Neutrophil % 81.3 (H) %      Lymphocyte % 10.3 (L) %      Monocyte % 8.0 %      Eosinophil % 0.2 %      Basophil % 0.1 %      Immature Grans % 0.1 %      Neutrophils, Absolute 8.64 (H) 10*3/mm3      " Lymphocytes, Absolute 1.09 10*3/mm3      Monocytes, Absolute 0.85 10*3/mm3      Eosinophils, Absolute 0.02 10*3/mm3      Basophils, Absolute 0.01 10*3/mm3      Immature Grans, Absolute 0.01 10*3/mm3     Comprehensive Metabolic Panel [[id number]]  (Abnormal) Collected:  01/24/18 0609    Specimen:  Blood Updated:  01/24/18 0826     Glucose 106 (H) mg/dL      BUN 10 mg/dL      Creatinine 0.60 mg/dL      Sodium 135 mmol/L      Potassium 3.3 (L) mmol/L      Chloride 104 mmol/L      CO2 25.0 mmol/L      Calcium 8.5 (L) mg/dL      Total Protein 5.8 g/dL      Albumin 3.50 g/dL      ALT (SGPT) 30 U/L      AST (SGOT) 23 U/L      Alkaline Phosphatase 54 U/L      Total Bilirubin 0.8 mg/dL      eGFR Non African Amer 107 mL/min/1.73      Globulin 2.3 gm/dL      A/G Ratio 1.5 g/dL      BUN/Creatinine Ratio 16.7     Anion Gap 6.0 mmol/L     Narrative:       [employer] Guidelines    Stage     Description        GFR  1         Normal or High     90+  2         Mild decrease      60-89  3         Moderate decrease  30-59  4         Severe decrease    15-29  5         Kidney failure     <15    Iron [[id number]]  (Abnormal) Collected:  01/24/18 0609    Specimen:  Blood Updated:  01/24/18 0850     Iron 17 (L) mcg/dL             Assessment/Plan     POD # 1 s/p LSG/paraesophageal HHR    Doing well.  UGI normal post-sleeve, images reviewed.  IV iron given for low Fe.  Patient feels well and has met discharge criteria.  Will discharge home with follow up in 1 week with PA.  Rx given for Norco, zofran, omeprazole.  She will hold home triamterene/Hctz.   Discharge instructions reviewed with patient and all questions answered.              01/24/18  2:32 PM  Millie Mccann MD

## 2018-01-24 NOTE — H&P
[facility] BARIATRIC SURGERY  [address]  [facility] [address]  [phone]          Patient  Name:  Nichelle Casey  :  1970          Date of Visit: 2018          Chief Complaint:  weight gain; unable to maintain weight loss      History of Present Illness:  Nichelle Casey is a 47 y.o. female who presents today for evaluation, education and consultation regarding weight loss surgery.        Nichelle Casey is a 47 y.o. female who presents today for evaluation, education and consultation regarding weight loss surgery.       Nichelle returns for last visit today. She has been overweight for many years with may failed weight loss attempts.  She now has comorbidities of HTN, HLD, chronic back pain, dyspnea on exertion, fatigue, and OA, and has decided to pursue weight loss surgery.  There has been no change in medical history.  Since last visit, she has obtained Cardiac clearance for surgery, and no further cardiac workup was needed.    She has attended >90 minutes class today and listened attentively, asking appropriate questions that demonstrated her understanding adherence to dietary/medication/lifestyle/vitamin recommendations.       Review of data:      OSORIO: Norco 5, gabapentin  CBC: nl  CMP: K 3.2 (on Hctz)  EGD: PQ, 17 39 cm, no HH, De reflux, mild chronic gastritis  HP negative  Cardiac clearance: 2017, cleared  Echo: n/a  Stress test: n/a  PFTs: n/a  Pulm clearance: n/a  EKG: NSR, nonspecific T wave abnormality  CXR: nl          Last tobacco: n/a  Last NSAIDs: 2 weeks ago  Last ASA: n/a  Last steroids: n/a  Last hormones: n/a                 Medical History             Past Medical History:   Diagnosis Date   • Chronic back pain         herniated disc w/ sciatica   • Dyspepsia      • Dyspnea on exertion      • Elevated LFTs      • Fatigue      • Heartburn         prn OTC meds   • Hormone replacement therapy (HRT)         shell-menopausal   •  HTN (hypertension)      • Hypertriglyceridemia      • Obesity      • Osteoarthritis                     Surgical History               Past Surgical History:   Procedure Laterality Date   • TUBAL ABDOMINAL LIGATION    1990   • VAGINAL HYSTERECTOMY    2009      partial - uterine fibroids, laparoscopic   • WISDOM TOOTH EXTRACTION    1993                       Allergies   Allergen Reactions   • Prednisone Shortness Of Breath           Current Outpatient Prescriptions:   •  carvedilol (COREG) 25 MG tablet, Take 25 mg by mouth 2 (Two) Times a Day With Meals. 1.5 bid, Disp: , Rfl:   •  gabapentin (NEURONTIN) 300 MG capsule, 300 mg 3 (Three) Times a Day., Disp: , Rfl:   •  losartan-hydrochlorothiazide (HYZAAR) 100-25 MG per tablet, Take 1 tablet by mouth Daily., Disp: , Rfl:   •  triamterene-hydrochlorothiazide (MAXZIDE-25) 37.5-25 MG per tablet, Take 1 tablet by mouth Daily., Disp: , Rfl:   •  HYDROcodone-acetaminophen (NORCO) 5-325 MG per tablet, Take 1 tablet by mouth Every 6 (Six) Hours As Needed., Disp: , Rfl:              Social History     Social History                 Social History   • Marital status:          Spouse name: Devang Casey   • Number of children: 2   • Years of education: 12th grade               Occupational History   •                       Social History Main Topics   • Smoking status: Never Smoker   • Smokeless tobacco: Never Used   • Alcohol use No   • Drug use: No   • Sexual activity: Yes         Partners: Male            Comment:                 Other Topics Concern   • Not on file           Social History Narrative      Lives in [place], [place] w/ .   for [employer].                      Family History   Problem Relation Age of Onset   • Hypertension Mother      • Diabetes Father      • Hypertension Father      • Heart disease Father      • Prostate cancer Father      • Hypertension Sister      • Hypertension Maternal Grandmother       • Diabetes Maternal Grandmother      • Lung cancer Maternal Grandfather      • Emphysema Paternal Grandmother      • Heart attack Paternal Grandfather              Review of Systems:  Constitutional:  The patient reports fatigue, weight gain and denies fevers and chills.  Cardiovascular:  The patient reports HTN and denies heart disease and DVT.  Respiratory:  The patient reports none and denies asthma, apnea and PE.  Gastrointestinal:  The patient reports heartburn and denies pancreatitis, liver disease and IBS.  Genitourinary:  The patient denies renal insufficiency.    Musculoskeletal:  The patient reports joint pain, arthritis and denies fibromyalgia.  Neurological:  The patient reports none and denies seizure and stroke.  Psychiatric:  The patient reports none and denies anxiety, depression and bipolar disorder.  Endocrine:  The patient reports none and denies diabetes, thyroid disease and gout.  Hematologic:  The patient reports none and denies anemia and bleeding disorder.  Skin:  The patient denies MRSA.      Physical Exam:  Vital Signs:  Weight: 112 kg (246 lb 8 oz)   Body mass index is 36.94 kg/(m^2).  Temp: 98.1 °F (36.7 °C)   Heart Rate: 77   BP: 135/89       Physical Exam   Constitutional: She is oriented to person, place, and time. She appears well-developed and well-nourished. No distress.   HENT:   Head: Normocephalic and atraumatic.   Mouth/Throat: No oropharyngeal exudate.   Eyes: Conjunctivae and EOM are normal. Pupils are equal, round, and reactive to light.   Neck: Normal range of motion. Neck supple. No tracheal deviation present. No thyromegaly present.   Cardiovascular: Normal rate, regular rhythm and normal heart sounds.    Pulmonary/Chest: Effort normal and breath sounds normal.   Abdominal: Soft. She exhibits no distension. There is no tenderness.   umbi hernia, small umbilical port site incision   Musculoskeletal: Normal range of motion. She exhibits no edema or deformity.    Neurological: She is alert and oriented to person, place, and time. No cranial nerve deficit.   Skin: Skin is warm and dry. No rash noted. She is not diaphoretic. No erythema.   Psychiatric: She has a normal mood and affect. Her behavior is normal. Judgment and thought content normal.                 Patient Active Problem List   Diagnosis   • Obesity   • Fatigue   • Dyspepsia   • Dyspnea on exertion   • Hormone replacement therapy (HRT)   • HTN (hypertension)   • Hypertriglyceridemia   • Chronic back pain   • Heartburn   • Elevated LFTs   • Osteoarthritis   • Obesity, Class II, BMI 35-39.9           Assessment:      Nichelle Casey is a 47 y.o. year old female with medically complicated obesity.      Weight loss surgery is deemed medically necessary given the following obesity related comorbidities including hypertension, dyslipidemia, osteoarthritis, back pain and GERD with current Weight: 112 kg (246 lb 8 oz) and Body mass index is 36.94 kg/(m^2)..      Patient is aware that surgery is a tool, and that weight loss is not guaranteed but only seen in the context of appropriate use, follow up and exercise.      The patient was present for an approximately a 2.5 hour discussion of the purpose of weight loss surgery, how WLS is a tool to assist in achieving weight loss goals, the most common complications and how best to avoid them, and the strategies for short and long term weight loss.  Ample opportunity to discuss questions was available both in group and during the time of individual examination.      I reviewed all available preop labs, Xrays, tests, clearances, etc and signed off on these in the record.  All of this in addition to the patient's unique history and exam has been taken into consideration in determining their appropriate candidacy for weight loss surgery.      Complications  of laparoscopic/possible robotic gastric sleeve were discussed. The patient is well aware of the potential complications  "of surgery that include but not limited to bleeding, infections, deep venous thrombosis, pulmonary embolism, pulmonary complications such as pneumonia, cardiac events, hernias, small bowel obstruction, damage to the spleen or other organs, bowel injury, disfiguring scars, failure to lose weight, need for additional surgery, conversion to an open procedure, and death. Patient is also aware of complications which apply in this particular procedure that can include but are not limited to a \"leak\" at the staple line which in some instances may require conversion to gastric bypass.      The patient is aware if a hiatal hernia is encountered, it likely will be repaired.  R/B/A Rx to hiatal hernia repair were discussed as outlined in our long consent form.  Briefly risks in addition to those for LSG include recurrent hernia, LACIE, dysphagia, esophageal injury, pneumothorax, injury to the vagus nerves, injury to the thoracic duct, aorta or vena cava.      Greater than 3 minutes was spent with the patient discussing avoiding all tobacco products and second hand smoke at least 2 weeks pre-operatively and 6 weeks post-operatively to minimize the risk of sleeve leak.  This included discussing the importance of avoiding even secondhand smoke as the risk of leak is increased.  Examples discussed:  I made it very clear that the patient understands they should avoid even riding in a car where someone has previously smoked in the last 2 weeks, living in a house where someone smokes (even if it's in a separate room/patio/attached garage, etc.) we discussed that they should not have a conversation with a group of people who are smoking even if it's outside.  They can be around wood burning fires and barbecue.  I told them I do not know if marijuana has a same effects but my overall recommendation is to avoid it for 2 weeks prior in 6 weeks after surgery.  They also are aware that nicotine may also increase the risk of leak and I " strongly encouraged him to avoid that as well for 2 weeks prior in 6 weeks after surgery.      Discussed the risks, benefits and alternative therapies at great length as outlined in our extensive consent forms, consent videos, and educational teaching process under the direction of the center's .      A copy of the patient's signed informed consent is on file.      Plan:  Laparoscopic sleeve gastrectomy               Millie Mccann MD

## 2018-01-24 NOTE — PROGRESS NOTES
Discharge Planning Assessment  [facility]     Patient Name: Nichelle Casey  MRN: [id number]  Today's Date: 1/24/2018    Admit Date: 1/23/2018          Discharge Needs Assessment       01/24/18 0930    Living Environment    Lives With spouse    Living Arrangements house    Home Accessibility no concerns    Stair Railings at Home none    Type of Financial/Environmental Concern none    Transportation Available family or friend will provide    Living Environment    Provides Primary Care For no one    Quality Of Family Relationships supportive;helpful;involved    Able to Return to Prior Living Arrangements yes    Discharge Needs Assessment    Concerns To Be Addressed no discharge needs identified    Readmission Within The Last 30 Days no previous admission in last 30 days    Anticipated Changes Related to Illness none    Equipment Currently Used at Home none    Equipment Needed After Discharge none    Discharge Contact Information if Applicable Devang Casey [phone]            Discharge Plan       01/24/18 0930    Case Management/Social Work Plan    Plan home    Patient/Family In Agreement With Plan yes    Additional Comments Pt lives in [facility] with her . She denies use of any DME or HH. She was independent with all ADLs prior to admit. Pt is followed by her PCP and reports her medications are covered by insurance. Her goal for discharge is to return home. No discharge needs at this time.        Discharge Placement     No information found                Demographic Summary       01/24/18 0929    Referral Information    Admission Type inpatient    Referral Source physician    Reason For Consult discharge planning    Record Reviewed history and physical    Contact Information    Permission Granted to Share Information With ;family/designee    Primary Care Physician Information    Name Sarita Velizlor            Functional Status       01/24/18 0929    Functional Status Current    Current  Functional Level Comment See PT eval    Functional Status Prior    Ambulation 0-->independent    Transferring 0-->independent    Toileting 0-->independent    Bathing 0-->independent    Dressing 0-->independent    Eating 0-->independent    Communication 0-->understands/communicates without difficulty    Swallowing 0-->swallows foods/liquids without difficulty    IADL    Medications independent    Meal Preparation independent    Housekeeping independent    Laundry independent    Shopping independent    Oral Care independent            Psychosocial     None            Abuse/Neglect     None            Legal     None            Substance Abuse     None            Patient Forms     None          Mariah Deluna RN

## 2018-02-01 ENCOUNTER — OFFICE VISIT (OUTPATIENT)
Dept: BARIATRICS/WEIGHT MGMT | Facility: CLINIC | Age: 48
End: 2018-02-01

## 2018-02-01 VITALS
OXYGEN SATURATION: 99 % | HEART RATE: 87 BPM | BODY MASS INDEX: 34.51 KG/M2 | WEIGHT: 233 LBS | RESPIRATION RATE: 18 BRPM | TEMPERATURE: 97.9 F | DIASTOLIC BLOOD PRESSURE: 94 MMHG | HEIGHT: 69 IN | SYSTOLIC BLOOD PRESSURE: 137 MMHG

## 2018-02-01 DIAGNOSIS — Z98.84 STATUS POST BARIATRIC SURGERY: ICD-10-CM

## 2018-02-01 DIAGNOSIS — E78.5 HYPERLIPIDEMIA, UNSPECIFIED HYPERLIPIDEMIA TYPE: ICD-10-CM

## 2018-02-01 DIAGNOSIS — M54.9 BACK PAIN, UNSPECIFIED BACK LOCATION, UNSPECIFIED BACK PAIN LATERALITY, UNSPECIFIED CHRONICITY: ICD-10-CM

## 2018-02-01 DIAGNOSIS — R53.83 FATIGUE, UNSPECIFIED TYPE: Primary | ICD-10-CM

## 2018-02-01 DIAGNOSIS — R12 HEARTBURN: ICD-10-CM

## 2018-02-01 DIAGNOSIS — E66.9 OBESITY, CLASS II, BMI 35-39.9: ICD-10-CM

## 2018-02-01 DIAGNOSIS — I10 ESSENTIAL HYPERTENSION: ICD-10-CM

## 2018-02-01 PROCEDURE — 99024 POSTOP FOLLOW-UP VISIT: CPT | Performed by: SURGERY

## 2018-02-01 RX ORDER — URSODIOL 300 MG/1
300 CAPSULE ORAL 2 TIMES DAILY
Qty: 60 CAPSULE | Refills: 5 | Status: SHIPPED | OUTPATIENT
Start: 2018-02-01 | End: 2018-03-03

## 2018-02-01 NOTE — PROGRESS NOTES
[facility] Bariatric Surgery  [address]  [facility] [address]  [phone]      Patient Name:  Nichelle Casey.  :  1970      Date of Visit: 2018      Reason for Visit:  POD #9    HPI:  Nichelle Casey is a 47 y.o. female s/p LSG/paraesophageal hernia repair by PQ 18.    Doing well.  No issues/concerns. Denies dysphagia, reflux, nausea, vomiting and abdominal pain.  Tolerating diet progression - on stage 2.  Getting 85-100g prot/day.  Drinking 64fluid oz/day.  Taking Patches: all the Bariatric patches.  On Omeprazole .   Taking KCl per PCP.  Rechecking K this week.  Holding ASA , NSAIDs , Hormones, Diuretics  and Steroids.  Ambulating.     Presurgery weight: 246 pounds.  Today's weight is 106 kg (233 lb) pounds, today's  Body mass index is 34.91 kg/(m^2)., and her weight loss since surgery is 13 pounds.       Path reviewed with patient:  Final Diagnosis    SUBTOTAL GASTRECTOMY:  No histopathologic abnormality of gastric wall.         Past Medical History:   Diagnosis Date   • Chronic back pain     herniated disc w/ sciatica   • Dyspepsia    • Dyspnea on exertion    • Elevated LFTs    • Fatigue    • Heartburn     prn OTC meds   • Hormone replacement therapy (HRT)     shell-menopausal   • HTN (hypertension)    • Hypertriglyceridemia    • Obesity    • Osteoarthritis      Past Surgical History:   Procedure Laterality Date   • GASTRIC SLEEVE LAPAROSCOPIC N/A 2018    Procedure: GASTRIC SLEEVE LAPAROSCOPIC;  Surgeon: Millie Mccann MD;  Location:  [facility];  Service:    • PARAESOPHAGEAL HERNIA REPAIR N/A 2018    Procedure: PARAESOPHAGEAL HERNIA REPAIR LAPAROSCOPIC;  Surgeon: Millie Mccann MD;  Location:  [facility];  Service:    • TUBAL ABDOMINAL LIGATION     • VAGINAL HYSTERECTOMY      partial - uterine fibroids, laparoscopic   • WISDOM TOOTH EXTRACTION       Outpatient Prescriptions Marked as Taking for the 18 encounter  "(Office Visit) with Millie Mccann MD   Medication Sig Dispense Refill   • carvedilol (COREG) 25 MG tablet Take 37.5 mg by mouth 2 (Two) Times a Day With Meals.     • gabapentin (NEURONTIN) 300 MG capsule Take 300 mg by mouth 3 (Three) Times a Day.     • losartan (COZAAR) 100 MG tablet Take 100 mg by mouth Daily.     • omeprazole (PRILOSEC) 40 MG capsule Take 1 capsule by mouth Daily. 60 capsule 0     Allergies   Allergen Reactions   • Prednisone Shortness Of Breath       Social History     Social History   • Marital status:      Spouse name: Devang Casey   • Number of children: 2   • Years of education: 12th grade     Occupational History   •       Social History Main Topics   • Smoking status: Never Smoker   • Smokeless tobacco: Never Used   • Alcohol use No   • Drug use: No   • Sexual activity: Yes     Partners: Male      Comment:       Other Topics Concern   • Not on file     Social History Narrative    Lives in [place], [place] w/ .   for [employer].         /94 (BP Location: Left arm, Patient Position: Sitting, Cuff Size: Large Adult)  Pulse 87  Temp 97.9 °F (36.6 °C) (Temporal Artery )   Resp 18  Ht 174 cm (68.5\")  Wt 106 kg (233 lb)  SpO2 99%  BMI 34.91 kg/m2  Physical Exam   Constitutional: She is oriented to person, place, and time. She appears well-developed and well-nourished. No distress.   HENT:   Head: Normocephalic and atraumatic.   Mouth/Throat: No oropharyngeal exudate.   Eyes: Conjunctivae and EOM are normal. Pupils are equal, round, and reactive to light.   Pulmonary/Chest: Effort normal. No respiratory distress.   Abdominal: Soft. She exhibits no distension.   Incisions well healing without induration/erythema/fluctuance/drainage   Neurological: She is alert and oriented to person, place, and time. No cranial nerve deficit.   Skin: Skin is warm and dry. No rash noted. She is not diaphoretic. No erythema. "   Psychiatric: She has a normal mood and affect. Her behavior is normal. Judgment and thought content normal.         Assessment:   POD # 9      Plan:  Doing well. Continue to advance diet per manual.  Increase protein intake to 100g/day.  Actigall Rx given.  Increase exercise/activity as tolerated.  Reviewed lifting restrictions, nothing >25 lbs x 2 more weeks.  Continue vitamins.  Continue PPI.  Continue to avoid ASA/NSAIDs/Steroids x 6 weeks postop.  Call w/ problems/concerns.    The patient was instructed to follow up in 3 weeks, sooner if needed.     Millie Mccann MD

## 2018-02-20 ENCOUNTER — OFFICE VISIT (OUTPATIENT)
Dept: BARIATRICS/WEIGHT MGMT | Facility: CLINIC | Age: 48
End: 2018-02-20

## 2018-02-20 VITALS
HEIGHT: 69 IN | OXYGEN SATURATION: 99 % | RESPIRATION RATE: 18 BRPM | DIASTOLIC BLOOD PRESSURE: 90 MMHG | SYSTOLIC BLOOD PRESSURE: 132 MMHG | TEMPERATURE: 97.5 F | WEIGHT: 226.01 LBS | HEART RATE: 80 BPM | BODY MASS INDEX: 33.48 KG/M2

## 2018-02-20 DIAGNOSIS — K91.2 POSTGASTRECTOMY MALABSORPTION: ICD-10-CM

## 2018-02-20 DIAGNOSIS — R53.83 FATIGUE, UNSPECIFIED TYPE: Primary | ICD-10-CM

## 2018-02-20 DIAGNOSIS — Z98.84 STATUS POST BARIATRIC SURGERY: ICD-10-CM

## 2018-02-20 DIAGNOSIS — Z13.21 MALNUTRITION SCREEN: ICD-10-CM

## 2018-02-20 DIAGNOSIS — Z90.3 POSTGASTRECTOMY MALABSORPTION: ICD-10-CM

## 2018-02-20 DIAGNOSIS — E78.5 HYPERLIPIDEMIA, UNSPECIFIED HYPERLIPIDEMIA TYPE: ICD-10-CM

## 2018-02-20 DIAGNOSIS — I10 ESSENTIAL HYPERTENSION: ICD-10-CM

## 2018-02-20 DIAGNOSIS — E55.9 HYPOVITAMINOSIS D: ICD-10-CM

## 2018-02-20 DIAGNOSIS — M54.9 BACK PAIN, UNSPECIFIED BACK LOCATION, UNSPECIFIED BACK PAIN LATERALITY, UNSPECIFIED CHRONICITY: ICD-10-CM

## 2018-02-20 DIAGNOSIS — Z13.0 SCREENING, IRON DEFICIENCY ANEMIA: ICD-10-CM

## 2018-02-20 PROCEDURE — 99024 POSTOP FOLLOW-UP VISIT: CPT | Performed by: SURGERY

## 2018-02-20 NOTE — PROGRESS NOTES
"[facility] Bariatric Surgery  [address]  [facility] [address]  [phone]      Patient Name:  Nichelle Casey.  :  1970      Date of Visit: 2018      Reason for Visit:  1 month postop    HPI:  Nichelle Casey is a 47 y.o. female s/p LSG/paraesophageal hernia repair by  18    Doing well.  No issues/concerns. Had a \"stomach bug\" with nausea 2 weeks ago, and thinks everyone at work had it also.  Denies dysphagia, reflux, vomiting and abdominal pain.  \"I feel amazing, I have no reflux!\"  Tolerating diet progression - on stage 4.  Getting 70-100g prot/day.  Drinking 64+ fluid oz/day.  Taking Patches: Bariatric Pack (6 patches).  On Omeprazole  and Actigall .  Still holding ASA , NSAIDs , Tramadol, Hormones and Steroids.  Exercise: walking.     Presurgery weight:  246 pounds. Today's weight is 103 kg (226 lb 0.2 oz) pounds, today's Body mass index is 33.86 kg/(m^2)., and her weight loss since surgery is 20 pounds.       Past Medical History:   Diagnosis Date   • Chronic back pain     herniated disc w/ sciatica   • Dyspepsia    • Dyspnea on exertion    • Elevated LFTs    • Fatigue    • Heartburn     prn OTC meds   • Hormone replacement therapy (HRT)     shell-menopausal   • HTN (hypertension)    • Hypertriglyceridemia    • Obesity    • Osteoarthritis      Past Surgical History:   Procedure Laterality Date   • GASTRIC SLEEVE LAPAROSCOPIC N/A 2018    Procedure: GASTRIC SLEEVE LAPAROSCOPIC;  Surgeon: Millie Mccann MD;  Location:  [facility] OR;  Service:    • PARAESOPHAGEAL HERNIA REPAIR N/A 2018    Procedure: PARAESOPHAGEAL HERNIA REPAIR LAPAROSCOPIC;  Surgeon: Millie Mccann MD;  Location:  [facility] OR;  Service:    • TUBAL ABDOMINAL LIGATION     • VAGINAL HYSTERECTOMY      partial - uterine fibroids, laparoscopic   • WISDOM TOOTH EXTRACTION       Outpatient Prescriptions Marked as Taking for the 18 encounter (Office Visit) " "with Millie Mccann MD   Medication Sig Dispense Refill   • carvedilol (COREG) 25 MG tablet Take 37.5 mg by mouth 2 (Two) Times a Day With Meals.     • losartan (COZAAR) 100 MG tablet Take 100 mg by mouth Daily.     • omeprazole (PRILOSEC) 40 MG capsule Take 1 capsule by mouth Daily. 60 capsule 0   • ursodiol (ACTIGALL) 300 MG capsule Take 1 capsule by mouth 2 (Two) Times a Day for 30 days. 60 capsule 5     Allergies   Allergen Reactions   • Prednisone Shortness Of Breath       Social History     Social History   • Marital status:      Spouse name: Devang Casey   • Number of children: 2   • Years of education: 12th grade     Occupational History   •       Social History Main Topics   • Smoking status: Never Smoker   • Smokeless tobacco: Never Used   • Alcohol use No   • Drug use: No   • Sexual activity: Yes     Partners: Male      Comment:       Other Topics Concern   • Not on file     Social History Narrative    Lives in [place], [place] w/ .   for [employer].         /90 (BP Location: Left arm, Patient Position: Sitting, Cuff Size: Large Adult)  Pulse 80  Temp 97.5 °F (36.4 °C) (Temporal Artery )   Resp 18  Ht 174 cm (68.5\")  Wt 103 kg (226 lb 0.2 oz)  SpO2 99%  BMI 33.86 kg/m2    Physical Exam   Constitutional: She is oriented to person, place, and time. She appears well-developed and well-nourished. No distress.   HENT:   Head: Normocephalic and atraumatic.   Mouth/Throat: No oropharyngeal exudate.   Eyes: Conjunctivae and EOM are normal. Pupils are equal, round, and reactive to light.   Pulmonary/Chest: Effort normal. No respiratory distress.   Abdominal: Soft. She exhibits no distension. There is no tenderness.   Incisions well-healed   Neurological: She is alert and oriented to person, place, and time. No cranial nerve deficit.   Skin: Skin is warm and dry. No rash noted. She is not diaphoretic. No erythema.   Psychiatric: She has " a normal mood and affect. Her behavior is normal. Judgment and thought content normal.         Assessment:  1 month s/p LSG/paraesophageal hernia repair by ENRIKE 1/23/18    Plan:  Doing well.  Continue to advance diet per manual.  Continue protein >70g/day.  Encouraged good food choices - high protein, low carb.  Continue routine exercise.  Routine bariatric labs ordered.  Continue vitamins w/ adjustments pending lab results.  Call w/ problems/concerns.    The patient was instructed to follow up in 2 months, sooner if needed.     Millie Mccann MD

## 2018-02-23 LAB
25(OH)D3+25(OH)D2 SERPL-MCNC: 19.8 NG/ML
A-TOCOPHEROL VIT E SERPL-MCNC: 9.7 MG/L (ref 5.3–16.8)
ALBUMIN SERPL-MCNC: 3.9 G/DL (ref 3.2–4.8)
ALBUMIN/GLOB SERPL: 1.6 G/DL (ref 1.5–2.5)
ALP SERPL-CCNC: 71 U/L (ref 25–100)
ALT SERPL-CCNC: 23 U/L (ref 7–40)
AST SERPL-CCNC: 16 U/L (ref 0–33)
BASOPHILS # BLD AUTO: 0.03 10*3/MM3 (ref 0–0.2)
BASOPHILS NFR BLD AUTO: 0.5 % (ref 0–1)
BILIRUB SERPL-MCNC: 0.5 MG/DL (ref 0.3–1.2)
BUN SERPL-MCNC: 18 MG/DL (ref 9–23)
BUN/CREAT SERPL: 25.7 (ref 7–25)
CALCIUM SERPL-MCNC: 9.2 MG/DL (ref 8.7–10.4)
CHLORIDE SERPL-SCNC: 104 MMOL/L (ref 99–109)
CO2 SERPL-SCNC: 27 MMOL/L (ref 20–31)
CREAT SERPL-MCNC: 0.7 MG/DL (ref 0.6–1.3)
EOSINOPHIL # BLD AUTO: 0.09 10*3/MM3 (ref 0–0.3)
EOSINOPHIL NFR BLD AUTO: 1.4 % (ref 0–3)
ERYTHROCYTE [DISTWIDTH] IN BLOOD BY AUTOMATED COUNT: 15.8 % (ref 11.3–14.5)
FERRITIN SERPL-MCNC: 38 NG/ML (ref 10–291)
FOLATE SERPL-MCNC: 12.74 NG/ML (ref 3.2–20)
GFR SERPLBLD CREATININE-BSD FMLA CKD-EPI: 108 ML/MIN/1.73
GFR SERPLBLD CREATININE-BSD FMLA CKD-EPI: 89 ML/MIN/1.73
GLOBULIN SER CALC-MCNC: 2.4 GM/DL
GLUCOSE SERPL-MCNC: 109 MG/DL (ref 70–100)
HCT VFR BLD AUTO: 38.7 % (ref 34.5–44)
HGB BLD-MCNC: 12.4 G/DL (ref 11.5–15.5)
IMM GRANULOCYTES # BLD: 0.01 10*3/MM3 (ref 0–0.03)
IMM GRANULOCYTES NFR BLD: 0.2 % (ref 0–0.6)
IRON SERPL-MCNC: 35 MCG/DL (ref 50–175)
LYMPHOCYTES # BLD AUTO: 1.2 10*3/MM3 (ref 0.6–4.8)
LYMPHOCYTES NFR BLD AUTO: 18.6 % (ref 24–44)
MAGNESIUM SERPL-MCNC: 2 MG/DL (ref 1.3–2.7)
MCH RBC QN AUTO: 25.8 PG (ref 27–31)
MCHC RBC AUTO-ENTMCNC: 32 G/DL (ref 32–36)
MCV RBC AUTO: 80.5 FL (ref 80–99)
METHYLMALONATE SERPL-SCNC: 271 NMOL/L (ref 0–378)
MONOCYTES # BLD AUTO: 0.43 10*3/MM3 (ref 0–1)
MONOCYTES NFR BLD AUTO: 6.7 % (ref 0–12)
NEUTROPHILS # BLD AUTO: 4.68 10*3/MM3 (ref 1.5–8.3)
NEUTROPHILS NFR BLD AUTO: 72.6 % (ref 41–71)
PHOSPHATE SERPL-MCNC: 2.6 MG/DL (ref 2.4–5.1)
PLATELET # BLD AUTO: 219 10*3/MM3 (ref 150–450)
POTASSIUM SERPL-SCNC: 3.3 MMOL/L (ref 3.5–5.5)
PREALB SERPL-MCNC: 21 MG/DL (ref 12–34)
PROT SERPL-MCNC: 6.3 G/DL (ref 5.7–8.2)
PTH-INTACT SERPL-MCNC: 31 PG/ML (ref 15–65)
RBC # BLD AUTO: 4.81 10*6/MM3 (ref 3.89–5.14)
SODIUM SERPL-SCNC: 140 MMOL/L (ref 132–146)
VIT A SERPL-MCNC: 48 UG/DL (ref 20–65)
VIT B1 BLD-SCNC: 220.8 NMOL/L (ref 66.5–200)
WBC # BLD AUTO: 6.44 10*3/MM3 (ref 3.5–10.8)
ZINC SERPL-MCNC: 76 UG/DL (ref 56–134)

## 2018-03-05 RX ORDER — ERGOCALCIFEROL 1.25 MG/1
50000 CAPSULE ORAL WEEKLY
Qty: 12 CAPSULE | Refills: 0 | Status: SHIPPED | OUTPATIENT
Start: 2018-03-05 | End: 2018-10-24

## 2018-04-23 ENCOUNTER — OFFICE VISIT (OUTPATIENT)
Dept: BARIATRICS/WEIGHT MGMT | Facility: CLINIC | Age: 48
End: 2018-04-23

## 2018-04-23 VITALS
OXYGEN SATURATION: 99 % | TEMPERATURE: 97.9 F | RESPIRATION RATE: 18 BRPM | HEIGHT: 69 IN | BODY MASS INDEX: 31.77 KG/M2 | HEART RATE: 69 BPM | SYSTOLIC BLOOD PRESSURE: 170 MMHG | DIASTOLIC BLOOD PRESSURE: 103 MMHG | WEIGHT: 214.5 LBS

## 2018-04-23 DIAGNOSIS — R53.83 FATIGUE, UNSPECIFIED TYPE: ICD-10-CM

## 2018-04-23 DIAGNOSIS — Z90.3 POSTGASTRECTOMY MALABSORPTION: ICD-10-CM

## 2018-04-23 DIAGNOSIS — E55.9 HYPOVITAMINOSIS D: ICD-10-CM

## 2018-04-23 DIAGNOSIS — Z98.84 STATUS POST BARIATRIC SURGERY: Primary | ICD-10-CM

## 2018-04-23 DIAGNOSIS — E66.9 OBESITY, CLASS I, BMI 30-34.9: ICD-10-CM

## 2018-04-23 DIAGNOSIS — K91.2 POSTGASTRECTOMY MALABSORPTION: ICD-10-CM

## 2018-04-23 DIAGNOSIS — Z13.0 SCREENING, IRON DEFICIENCY ANEMIA: ICD-10-CM

## 2018-04-23 DIAGNOSIS — Z13.21 MALNUTRITION SCREEN: ICD-10-CM

## 2018-04-23 PROCEDURE — 99024 POSTOP FOLLOW-UP VISIT: CPT | Performed by: PHYSICIAN ASSISTANT

## 2018-04-23 NOTE — PROGRESS NOTES
[facility] Bariatric Surgery  [address]  [facility] [address]  [phone]        Patient Name:  Nichelle Casey.  :  1970      Date of Visit: 2018      Reason for Visit:   3 months postop      HPI: Nichelle Casey is a 47 y.o. female  s/p LSG/paraesophageal hernia repair by PQ 18     Doing well. No issues/concerns. Denies dysphagia, reflux, nausea, vomiting and abdominal pain.  Getting 100-120g prot/day.  Drinking 64+ fluid oz/day.  Protein shake with coffee in morning, lunch/ dinner chicken and vegetables, 1-2 snacks- turkey/ cheese stick.  1 month labs revealed low elevated MMA, low Vit D, low iron- advised stop B1, increase B12 to 2500mcg, weekly Vit D, iron BID.  Taking MVI, B12, Vit D, iron, Vit C and Biotin, D is weekly, B12 is 1000mcg.  On Actigall , does not require antacid.  Exercising: videos at home.     Presurgery weight: 246 pounds.  Today's weight is 97.3 kg (214 lb 8 oz) pounds, today's  Body mass index is 32.14 kg/m²., and her weight loss since surgery is 32 pounds.      Past Medical History:   Diagnosis Date   • Chronic back pain     herniated disc w/ sciatica   • Dyspepsia    • Dyspnea on exertion    • Elevated LFTs    • Fatigue    • Heartburn     prn OTC meds   • Hormone replacement therapy (HRT)     shell-menopausal   • HTN (hypertension)    • Hypertriglyceridemia    • Obesity    • Osteoarthritis      Past Surgical History:   Procedure Laterality Date   • GASTRIC SLEEVE LAPAROSCOPIC N/A 2018    Procedure: GASTRIC SLEEVE LAPAROSCOPIC;  Surgeon: Millie Mccann MD;  [facility];  Service:    • PARAESOPHAGEAL HERNIA REPAIR N/A 2018    Procedure: PARAESOPHAGEAL HERNIA REPAIR LAPAROSCOPIC;  Surgeon: Millie Mccann MD;  Location:  [facility] OR;  Service:    • TUBAL ABDOMINAL LIGATION     • VAGINAL HYSTERECTOMY      partial - uterine fibroids, laparoscopic   • WISDOM TOOTH EXTRACTION       Outpatient  "Prescriptions Marked as Taking for the 4/23/18 encounter (Office Visit) with Ana Cadena PA-C   Medication Sig Dispense Refill   • carvedilol (COREG) 25 MG tablet Take 37.5 mg by mouth 2 (Two) Times a Day With Meals.     • losartan (COZAAR) 100 MG tablet Take 100 mg by mouth Daily.     • vitamin D (ERGOCALCIFEROL) 94161 units capsule capsule Take 1 capsule by mouth 1 (One) Time Per Week. 12 capsule 0       Allergies   Allergen Reactions   • Prednisone Shortness Of Breath       Social History     Social History   • Marital status:      Spouse name: Devang Casey   • Number of children: 2   • Years of education: 12th grade     Occupational History   •       Social History Main Topics   • Smoking status: Never Smoker   • Smokeless tobacco: Never Used   • Alcohol use No   • Drug use: No   • Sexual activity: Yes     Partners: Male      Comment:       Other Topics Concern   • Not on file     Social History Narrative    Lives in [place], [place] w/ .   for [employer].         BP (!) 170/103 (BP Location: Left arm, Patient Position: Sitting, Cuff Size: Large Adult) Comment: Checked manually as well  Pulse 69   Temp 97.9 °F (36.6 °C) (Temporal Artery )   Resp 18   Ht 174 cm (68.5\")   Wt 97.3 kg (214 lb 8 oz)   SpO2 99%   BMI 32.14 kg/m²     Physical Exam   Constitutional: She appears well-developed and well-nourished.   HENT:   Head: Normocephalic and atraumatic.   Cardiovascular: Normal rate, regular rhythm and normal heart sounds.    Pulmonary/Chest: Effort normal and breath sounds normal.   Abdominal: Soft. Bowel sounds are normal.   Incisions well healed   Neurological: She is alert.   Skin: Skin is warm and dry.   Psychiatric: She has a normal mood and affect. Her behavior is normal. Judgment and thought content normal.         Assessment:  3 months  s/p LSG/paraesophageal hernia repair by PQ 1/23/18     ICD-10-CM ICD-9-CM   1. Status post bariatric " surgery Z98.84 V45.86   2. Obesity, Class I, BMI 30-34.9 E66.9 278.00   3. Hypovitaminosis D E55.9 268.9   4. Screening, iron deficiency anemia Z13.0 V78.0   5. Malnutrition screen Z13.21 V77.2   6. Postgastrectomy malabsorption K91.2 579.3    Z90.3    7. Fatigue, unspecified type R53.83 780.79         Plan:  Doing well. Continue w/ good food choices and healthy habits. May need to increase calories, target 1300.   Continue protein 70-100g/day.  Continue routine exercise. Continue actigall. Routine bariatric labs ordered.  Continue vitamins w/ adjustments pending lab results.  Call w/ problems/concerns.     The patient was instructed to follow up in 3 months, sooner if needed.      Total time spent w/ patient 25 minutes and 15 minutes spent counseling the patient on nutrition and necessary dietary/lifestyle modifications.

## 2018-04-26 LAB
25(OH)D3+25(OH)D2 SERPL-MCNC: 42.1 NG/ML
ALBUMIN SERPL-MCNC: 4 G/DL (ref 3.2–4.8)
ALBUMIN/GLOB SERPL: 1.7 G/DL (ref 1.5–2.5)
ALP SERPL-CCNC: 73 U/L (ref 25–100)
ALT SERPL-CCNC: 31 U/L (ref 7–40)
AST SERPL-CCNC: 20 U/L (ref 0–33)
BASOPHILS # BLD AUTO: 0.02 10*3/MM3 (ref 0–0.2)
BASOPHILS NFR BLD AUTO: 0.3 % (ref 0–1)
BILIRUB SERPL-MCNC: 0.7 MG/DL (ref 0.3–1.2)
BUN SERPL-MCNC: 16 MG/DL (ref 9–23)
BUN/CREAT SERPL: 22.9 (ref 7–25)
CALCIUM SERPL-MCNC: 9.1 MG/DL (ref 8.7–10.4)
CHLORIDE SERPL-SCNC: 104 MMOL/L (ref 99–109)
CO2 SERPL-SCNC: 28 MMOL/L (ref 20–31)
CREAT SERPL-MCNC: 0.7 MG/DL (ref 0.6–1.3)
EOSINOPHIL # BLD AUTO: 0.08 10*3/MM3 (ref 0–0.3)
EOSINOPHIL NFR BLD AUTO: 1 % (ref 0–3)
ERYTHROCYTE [DISTWIDTH] IN BLOOD BY AUTOMATED COUNT: 15.1 % (ref 11.3–14.5)
FERRITIN SERPL-MCNC: 23 NG/ML (ref 10–291)
FOLATE SERPL-MCNC: >24 NG/ML (ref 3.2–20)
GFR SERPLBLD CREATININE-BSD FMLA CKD-EPI: 108 ML/MIN/1.73
GFR SERPLBLD CREATININE-BSD FMLA CKD-EPI: 89 ML/MIN/1.73
GLOBULIN SER CALC-MCNC: 2.3 GM/DL
GLUCOSE SERPL-MCNC: 94 MG/DL (ref 70–100)
HCT VFR BLD AUTO: 40.3 % (ref 34.5–44)
HGB BLD-MCNC: 13.4 G/DL (ref 11.5–15.5)
IMM GRANULOCYTES # BLD: 0.01 10*3/MM3 (ref 0–0.03)
IMM GRANULOCYTES NFR BLD: 0.1 % (ref 0–0.6)
IRON SERPL-MCNC: 108 MCG/DL (ref 50–175)
LYMPHOCYTES # BLD AUTO: 1.54 10*3/MM3 (ref 0.6–4.8)
LYMPHOCYTES NFR BLD AUTO: 19.7 % (ref 24–44)
MCH RBC QN AUTO: 27.7 PG (ref 27–31)
MCHC RBC AUTO-ENTMCNC: 33.3 G/DL (ref 32–36)
MCV RBC AUTO: 83.4 FL (ref 80–99)
METHYLMALONATE SERPL-SCNC: 180 NMOL/L (ref 0–378)
MONOCYTES # BLD AUTO: 0.6 10*3/MM3 (ref 0–1)
MONOCYTES NFR BLD AUTO: 7.7 % (ref 0–12)
NEUTROPHILS # BLD AUTO: 5.56 10*3/MM3 (ref 1.5–8.3)
NEUTROPHILS NFR BLD AUTO: 71.2 % (ref 41–71)
PLATELET # BLD AUTO: 218 10*3/MM3 (ref 150–450)
POTASSIUM SERPL-SCNC: 3.5 MMOL/L (ref 3.5–5.5)
PREALB SERPL-MCNC: 21 MG/DL (ref 12–34)
PROT SERPL-MCNC: 6.3 G/DL (ref 5.7–8.2)
RBC # BLD AUTO: 4.83 10*6/MM3 (ref 3.89–5.14)
SODIUM SERPL-SCNC: 139 MMOL/L (ref 132–146)
VIT B1 BLD-SCNC: 119.3 NMOL/L (ref 66.5–200)
WBC # BLD AUTO: 7.81 10*3/MM3 (ref 3.5–10.8)

## 2018-07-23 ENCOUNTER — OFFICE VISIT (OUTPATIENT)
Dept: BARIATRICS/WEIGHT MGMT | Facility: CLINIC | Age: 48
End: 2018-07-23

## 2018-07-23 VITALS
BODY MASS INDEX: 29.7 KG/M2 | DIASTOLIC BLOOD PRESSURE: 84 MMHG | SYSTOLIC BLOOD PRESSURE: 120 MMHG | RESPIRATION RATE: 18 BRPM | HEIGHT: 69 IN | OXYGEN SATURATION: 99 % | WEIGHT: 200.5 LBS | TEMPERATURE: 97.8 F | HEART RATE: 76 BPM

## 2018-07-23 DIAGNOSIS — R53.83 FATIGUE, UNSPECIFIED TYPE: Primary | ICD-10-CM

## 2018-07-23 DIAGNOSIS — Z98.84 S/P BARIATRIC SURGERY: ICD-10-CM

## 2018-07-23 DIAGNOSIS — E66.9 OBESITY, CLASS I, BMI 30-34.9: ICD-10-CM

## 2018-07-23 DIAGNOSIS — I10 HYPERTENSION, UNSPECIFIED TYPE: ICD-10-CM

## 2018-07-23 DIAGNOSIS — G89.29 CHRONIC BACK PAIN, UNSPECIFIED BACK LOCATION, UNSPECIFIED BACK PAIN LATERALITY: ICD-10-CM

## 2018-07-23 DIAGNOSIS — M54.9 CHRONIC BACK PAIN, UNSPECIFIED BACK LOCATION, UNSPECIFIED BACK PAIN LATERALITY: ICD-10-CM

## 2018-07-23 PROCEDURE — 99213 OFFICE O/P EST LOW 20 MIN: CPT | Performed by: PHYSICIAN ASSISTANT

## 2018-07-23 RX ORDER — AMLODIPINE BESYLATE 5 MG/1
5 TABLET ORAL DAILY
COMMUNITY
End: 2018-10-24

## 2018-07-23 NOTE — PROGRESS NOTES
"[facility] Bariatric Surgery  [address]  [facility] [address] [phone]        Patient Name:  Nichelle Casey.  :  1970      Date of Visit: 2018      Reason for Visit:   6 months postop      HPI: Nichelle Casey is a 48 y.o. female s/p LSG/paraHHR by Dr. Mcacnn on 18    Feeling good.  Only issue is chronic back pain.  Has been trying to avoid NSAIDS/Norco since surgery.  Exercising - 5 days/week - walking/swimming.  No other issues/concerns.  Gets 100+g prot/day.  Says PCP did recent labs - \"looked good\".  Taking MVI, B12, Vit D and BID iron.  3 month bariatric labs WNL.      Presurgery weight: 246 pounds.  Today's weight is 90.9 kg (200 lb 8 oz) pounds, today's  Body mass index is 30.04 kg/m²., and her weight loss since surgery is 46 pounds.      Past Medical History:   Diagnosis Date   • Chronic back pain     herniated disc w/ sciatica   • Dyspepsia    • Dyspnea on exertion    • Elevated LFTs    • Fatigue    • Heartburn     prn OTC meds   • Hormone replacement therapy (HRT)     shell-menopausal   • HTN (hypertension)    • Hypertriglyceridemia    • Obesity    • Osteoarthritis      Past Surgical History:   Procedure Laterality Date   • GASTRIC SLEEVE LAPAROSCOPIC N/A 2018    Procedure: GASTRIC SLEEVE LAPAROSCOPIC;  Surgeon: Millie Mccann MD;  Location: [facility] OR;  Service:    • PARAESOPHAGEAL HERNIA REPAIR N/A 2018    Procedure: PARAESOPHAGEAL HERNIA REPAIR LAPAROSCOPIC;  Surgeon: Millie Mccann MD;  Location: [facility] OR;  Service:    • TUBAL ABDOMINAL LIGATION     • VAGINAL HYSTERECTOMY      partial - uterine fibroids, laparoscopic   • WISDOM TOOTH EXTRACTION       Outpatient Prescriptions Marked as Taking for the 18 encounter (Office Visit) with ANTIONE Robles   Medication Sig Dispense Refill   • amLODIPine (NORVASC) 5 MG tablet Take 5 mg by mouth Daily.     • carvedilol (COREG) 25 MG tablet Take 37.5 " "mg by mouth 2 (Two) Times a Day With Meals.     • losartan (COZAAR) 100 MG tablet Take 100 mg by mouth Daily.     • vitamin D (ERGOCALCIFEROL) 62463 units capsule capsule Take 1 capsule by mouth 1 (One) Time Per Week. 12 capsule 0       Allergies   Allergen Reactions   • Prednisone Shortness Of Breath       Social History     Social History   • Marital status:      Spouse name: Devang Casey   • Number of children: 2   • Years of education: 12th grade     Occupational History   •       Social History Main Topics   • Smoking status: Never Smoker   • Smokeless tobacco: Never Used   • Alcohol use No   • Drug use: No   • Sexual activity: Yes     Partners: Male      Comment:       Other Topics Concern   • Not on file     Social History Narrative    Lives in [place], [place] w/ .   for [employer].         /84 (BP Location: Left arm, Patient Position: Sitting, Cuff Size: Large Adult)   Pulse 76   Temp 97.8 °F (36.6 °C) (Temporal Artery )   Resp 18   Ht 174 cm (68.5\")   Wt 90.9 kg (200 lb 8 oz)   SpO2 99%   BMI 30.04 kg/m²     Physical Exam   Constitutional: She appears well-developed and well-nourished. She is cooperative.   HENT:   Mouth/Throat: Mucous membranes are normal.   Eyes: Conjunctivae are normal. No scleral icterus.   Cardiovascular: Normal rate.    Pulmonary/Chest: Effort normal.   Musculoskeletal: Normal range of motion. She exhibits no edema.   Neurological: She is alert.   Skin: Skin is warm and dry.   Psychiatric: She has a normal mood and affect. Judgment normal.         Assessment:  6 months s/p LSG/paraHHR by RICH on 1/23/18    ICD-10-CM ICD-9-CM   1. Fatigue, unspecified type R53.83 780.79   2. Chronic back pain, unspecified back location, unspecified back pain laterality M54.9 724.5    G89.29 338.29   3. Hypertension, unspecified type I10 401.9   4. Obesity, Class I, BMI 30-34.9 E66.9 278.00   5. S/P bariatric surgery Z98.84 V45.86 "         Plan:  Continue w/ good food choices and healthy habits.  Continue to focus on high protein, low carb.  Keep tracking intake w/ goal of 3940-9983 luana/day.  Continue routine exercise.  OK to use NSAIDS/steroids if needed.  No labs ordered today since had recent labs w/ PCP.  OK to reduce iron supplementation to once daily.  DC B12.  Continue MVI, Vit D and once daily iron.  Will repeat labs at next OV.  Call w/ problems/concerns.     The patient was instructed to follow up in 3 months, sooner if needed.

## 2018-10-24 ENCOUNTER — OFFICE VISIT (OUTPATIENT)
Dept: BARIATRICS/WEIGHT MGMT | Facility: CLINIC | Age: 48
End: 2018-10-24

## 2018-10-24 VITALS
DIASTOLIC BLOOD PRESSURE: 100 MMHG | RESPIRATION RATE: 18 BRPM | SYSTOLIC BLOOD PRESSURE: 152 MMHG | HEIGHT: 69 IN | HEART RATE: 72 BPM | BODY MASS INDEX: 29.1 KG/M2 | WEIGHT: 196.5 LBS | OXYGEN SATURATION: 99 % | TEMPERATURE: 97.6 F

## 2018-10-24 DIAGNOSIS — E55.9 HYPOVITAMINOSIS D: ICD-10-CM

## 2018-10-24 DIAGNOSIS — R53.83 FATIGUE, UNSPECIFIED TYPE: Primary | ICD-10-CM

## 2018-10-24 DIAGNOSIS — Z13.21 MALNUTRITION SCREEN: ICD-10-CM

## 2018-10-24 DIAGNOSIS — M54.9 BACK PAIN, UNSPECIFIED BACK LOCATION, UNSPECIFIED BACK PAIN LATERALITY, UNSPECIFIED CHRONICITY: ICD-10-CM

## 2018-10-24 DIAGNOSIS — E78.5 HYPERLIPIDEMIA, UNSPECIFIED HYPERLIPIDEMIA TYPE: ICD-10-CM

## 2018-10-24 DIAGNOSIS — Z90.3 POSTGASTRECTOMY MALABSORPTION: ICD-10-CM

## 2018-10-24 DIAGNOSIS — Z98.84 STATUS POST BARIATRIC SURGERY: ICD-10-CM

## 2018-10-24 DIAGNOSIS — K91.2 POSTGASTRECTOMY MALABSORPTION: ICD-10-CM

## 2018-10-24 DIAGNOSIS — R12 HEARTBURN: ICD-10-CM

## 2018-10-24 DIAGNOSIS — I10 ESSENTIAL HYPERTENSION: ICD-10-CM

## 2018-10-24 DIAGNOSIS — Z13.0 SCREENING, IRON DEFICIENCY ANEMIA: ICD-10-CM

## 2018-10-24 PROCEDURE — 99214 OFFICE O/P EST MOD 30 MIN: CPT | Performed by: SURGERY

## 2018-10-24 RX ORDER — NICARDIPINE HYDROCHLORIDE 20 MG/1
20 CAPSULE ORAL 3 TIMES DAILY
COMMUNITY
Start: 2018-10-11 | End: 2019-08-19

## 2018-10-24 RX ORDER — MELATONIN
1000 DAILY
COMMUNITY

## 2018-10-24 NOTE — PROGRESS NOTES
[facility] Bariatric Surgery  [address]  [facility] [address] [phone]        Patient Name:  Nichelle Casey.  :  1970      Date of Visit: 10/24/2018      Reason for Visit:   9 months postop    HPI: Nichelle Casey is a 48 y.o. female s/p LSG/paraHHR by Dr. Mccann on 18    For past 3 months, her BP has been poorly controlled.  Now on 3 meds.  Notes episodes of feeling like HR and BP are increased, intermittently.  Had renal Duplex last year--per patient no renal artery stenosis.      GERD resolved.    Back pain much better.    Doing well.  No issues/concerns. Denies dysphagia, reflux, nausea, vomiting and abdominal pain.  Getting 100+g prot/day.  Drinking 64 fluid oz/day.  6 month labs revealed no deficiencies. Taking MVI, Vit D and iron.  Finished 6 months of Actigall.  Exercise: walks almost daily, 4x per day.  Does a walking DVD with weights.       Presurgery weight: 246 pounds.  Today's weight is 89.1 kg (196 lb 8 oz) pounds, today's  Body mass index is 29.44 kg/m²., and her weight loss since surgery is 50 pounds.      Past Medical History:   Diagnosis Date   • Chronic back pain     herniated disc w/ sciatica   • Dyspepsia    • Dyspnea on exertion    • Elevated LFTs    • Fatigue    • Heartburn     prn OTC meds   • Hormone replacement therapy (HRT)     shell-menopausal   • HTN (hypertension)    • Hypertriglyceridemia    • Obesity    • Osteoarthritis      Past Surgical History:   Procedure Laterality Date   • GASTRIC SLEEVE LAPAROSCOPIC N/A 2018    Procedure: GASTRIC SLEEVE LAPAROSCOPIC;  Surgeon: Millie Mccann MD;  Location: [facility] OR;  Service:    • PARAESOPHAGEAL HERNIA REPAIR N/A 2018    Procedure: PARAESOPHAGEAL HERNIA REPAIR LAPAROSCOPIC;  Surgeon: Millie Mccann MD;  Location: [facility] OR;  Service:    • TUBAL ABDOMINAL LIGATION     • VAGINAL HYSTERECTOMY      partial - uterine fibroids, laparoscopic   • WISDOM  "TOOTH EXTRACTION  1993     Outpatient Prescriptions Marked as Taking for the 10/24/18 encounter (Office Visit) with Millie Mccann MD   Medication Sig Dispense Refill   • carvedilol (COREG) 25 MG tablet Take 37.5 mg by mouth 2 (Two) Times a Day With Meals.     • cholecalciferol (VITAMIN D3) 1000 units tablet Take 1,000 Units by mouth Daily.     • losartan (COZAAR) 100 MG tablet Take 100 mg by mouth Daily.     • niCARdipine (CARDENE) 20 MG capsule      • [DISCONTINUED] amLODIPine (NORVASC) 5 MG tablet Take 5 mg by mouth Daily.     • [DISCONTINUED] vitamin D (ERGOCALCIFEROL) 11227 units capsule capsule Take 1 capsule by mouth 1 (One) Time Per Week. 12 capsule 0       Allergies   Allergen Reactions   • Prednisone Shortness Of Breath       Social History     Social History   • Marital status:      Spouse name: Devang Casey   • Number of children: 2   • Years of education: 12th grade     Occupational History   •       Social History Main Topics   • Smoking status: Never Smoker   • Smokeless tobacco: Never Used   • Alcohol use No   • Drug use: No   • Sexual activity: Yes     Partners: Male      Comment:       Other Topics Concern   • Not on file     Social History Narrative    Lives in [place], [place] w/ .   for [employer].         /100 (BP Location: Left arm, Patient Position: Sitting, Cuff Size: Large Adult)   Pulse 72   Temp 97.6 °F (36.4 °C) (Temporal Artery )   Resp 18   Ht 174 cm (68.5\")   Wt 89.1 kg (196 lb 8 oz)   SpO2 99%   BMI 29.44 kg/m²     Physical Exam   Constitutional: She is oriented to person, place, and time. She appears well-developed and well-nourished. No distress.   HENT:   Head: Normocephalic and atraumatic.   Mouth/Throat: No oropharyngeal exudate.   Eyes: Pupils are equal, round, and reactive to light. Conjunctivae and EOM are normal.   Pulmonary/Chest: Effort normal. No respiratory distress.   Abdominal: Soft. She " exhibits no distension.   Neurological: She is alert and oriented to person, place, and time. No cranial nerve deficit.   Skin: Skin is warm and dry. She is not diaphoretic. No pallor.   Psychiatric: She has a normal mood and affect. Her behavior is normal. Thought content normal.         Assessment:  9 months s/p LSG/paraHHR by Dr. Mccann on 1/23/18      ICD-10-CM ICD-9-CM   1. Fatigue, unspecified type R53.83 780.79   2. Essential hypertension I10 401.9   3. Hyperlipidemia, unspecified hyperlipidemia type E78.5 272.4   4. Status post bariatric surgery Z98.84 V45.86   5. Hypovitaminosis D E55.9 268.9   6. Postgastrectomy malabsorption K91.2 579.3    Z90.3    7. Screening, iron deficiency anemia Z13.0 V78.0   8. Malnutrition screen Z13.21 V77.2   9. Heartburn R12 787.1   10. Back pain, unspecified back location, unspecified back pain laterality, unspecified chronicity M54.9 724.5         Plan:  Doing well. Continue w/ good food choices and healthy habits.  Continue protein >70g/day.  Continue routine exercise.  Routine bariatric labs ordered.  Continue vitamins w/ adjustments pending lab results.  Call w/ problems/concerns.     The patient was instructed to follow up in 3 months, sooner if needed.    note: approx 15 of the 25 minute visit was spent counseling on nutrition and necessary dietary/lifestyle modifications.    Millie Mccann MD

## 2018-10-27 LAB
25(OH)D3+25(OH)D2 SERPL-MCNC: 34.4 NG/ML
ALBUMIN SERPL-MCNC: 4.08 G/DL (ref 3.2–4.8)
ALBUMIN/GLOB SERPL: 2.1 G/DL (ref 1.5–2.5)
ALP SERPL-CCNC: 74 U/L (ref 25–100)
ALT SERPL-CCNC: 30 U/L (ref 7–40)
AST SERPL-CCNC: 21 U/L (ref 0–33)
BASOPHILS # BLD AUTO: 0.02 10*3/MM3 (ref 0–0.2)
BASOPHILS NFR BLD AUTO: 0.3 % (ref 0–1)
BILIRUB SERPL-MCNC: 1 MG/DL (ref 0.3–1.2)
BUN SERPL-MCNC: 18 MG/DL (ref 9–23)
BUN/CREAT SERPL: 28.1 (ref 7–25)
CALCIUM SERPL-MCNC: 9.3 MG/DL (ref 8.7–10.4)
CHLORIDE SERPL-SCNC: 104 MMOL/L (ref 99–109)
CO2 SERPL-SCNC: 29 MMOL/L (ref 20–31)
CREAT SERPL-MCNC: 0.64 MG/DL (ref 0.6–1.3)
EOSINOPHIL # BLD AUTO: 0.03 10*3/MM3 (ref 0–0.3)
EOSINOPHIL NFR BLD AUTO: 0.5 % (ref 0–3)
ERYTHROCYTE [DISTWIDTH] IN BLOOD BY AUTOMATED COUNT: 13 % (ref 11.3–14.5)
FERRITIN SERPL-MCNC: 27 NG/ML (ref 10–291)
FOLATE SERPL-MCNC: >24 NG/ML (ref 3.2–20)
GLOBULIN SER CALC-MCNC: 1.9 GM/DL
GLUCOSE SERPL-MCNC: 95 MG/DL (ref 70–100)
HCT VFR BLD AUTO: 41.7 % (ref 34.5–44)
HGB BLD-MCNC: 13.8 G/DL (ref 11.5–15.5)
IMM GRANULOCYTES # BLD: 0.02 10*3/MM3 (ref 0–0.03)
IMM GRANULOCYTES NFR BLD: 0.3 % (ref 0–0.6)
IRON SERPL-MCNC: 106 MCG/DL (ref 50–175)
LYMPHOCYTES # BLD AUTO: 1.23 10*3/MM3 (ref 0.6–4.8)
LYMPHOCYTES NFR BLD AUTO: 20.5 % (ref 24–44)
Lab: NORMAL
MCH RBC QN AUTO: 29.3 PG (ref 27–31)
MCHC RBC AUTO-ENTMCNC: 33.1 G/DL (ref 32–36)
MCV RBC AUTO: 88.5 FL (ref 80–99)
METHYLMALONATE SERPL-SCNC: 199 NMOL/L (ref 0–378)
MONOCYTES # BLD AUTO: 0.3 10*3/MM3 (ref 0–1)
MONOCYTES NFR BLD AUTO: 5 % (ref 0–12)
NEUTROPHILS # BLD AUTO: 4.41 10*3/MM3 (ref 1.5–8.3)
NEUTROPHILS NFR BLD AUTO: 73.4 % (ref 41–71)
PLATELET # BLD AUTO: 190 10*3/MM3 (ref 150–450)
POTASSIUM SERPL-SCNC: 3.8 MMOL/L (ref 3.5–5.5)
PREALB SERPL-MCNC: 21 MG/DL (ref 12–34)
PROT SERPL-MCNC: 6 G/DL (ref 5.7–8.2)
RBC # BLD AUTO: 4.71 10*6/MM3 (ref 3.89–5.14)
SODIUM SERPL-SCNC: 140 MMOL/L (ref 132–146)
VIT B1 BLD-SCNC: 163.6 NMOL/L (ref 66.5–200)
WBC # BLD AUTO: 6.01 10*3/MM3 (ref 3.5–10.8)

## 2019-02-01 ENCOUNTER — OFFICE VISIT (OUTPATIENT)
Dept: BARIATRICS/WEIGHT MGMT | Facility: CLINIC | Age: 49
End: 2019-02-01

## 2019-02-01 VITALS
SYSTOLIC BLOOD PRESSURE: 140 MMHG | HEIGHT: 70 IN | BODY MASS INDEX: 27.63 KG/M2 | WEIGHT: 193 LBS | HEART RATE: 68 BPM | DIASTOLIC BLOOD PRESSURE: 90 MMHG

## 2019-02-01 DIAGNOSIS — G89.29 CHRONIC BACK PAIN, UNSPECIFIED BACK LOCATION, UNSPECIFIED BACK PAIN LATERALITY: ICD-10-CM

## 2019-02-01 DIAGNOSIS — M54.9 CHRONIC BACK PAIN, UNSPECIFIED BACK LOCATION, UNSPECIFIED BACK PAIN LATERALITY: ICD-10-CM

## 2019-02-01 DIAGNOSIS — I10 ESSENTIAL HYPERTENSION: Primary | ICD-10-CM

## 2019-02-01 DIAGNOSIS — Z98.84 S/P BARIATRIC SURGERY: ICD-10-CM

## 2019-02-01 PROCEDURE — 99214 OFFICE O/P EST MOD 30 MIN: CPT | Performed by: PHYSICIAN ASSISTANT

## 2019-02-01 RX ORDER — SPIRONOLACTONE 25 MG/1
25 TABLET ORAL DAILY
COMMUNITY
Start: 2019-01-03

## 2019-02-01 RX ORDER — IRBESARTAN 300 MG/1
150 TABLET ORAL DAILY
COMMUNITY
Start: 2019-01-28

## 2019-02-01 RX ORDER — CLONIDINE HYDROCHLORIDE 0.1 MG/1
0.1 TABLET ORAL AS NEEDED
COMMUNITY
Start: 2018-10-29

## 2019-02-01 RX ORDER — NEBIVOLOL HYDROCHLORIDE 20 MG/1
20 TABLET ORAL 2 TIMES DAILY
COMMUNITY
Start: 2019-01-21

## 2019-02-01 RX ORDER — TORSEMIDE 10 MG/1
10 TABLET ORAL DAILY
COMMUNITY
Start: 2019-01-03

## 2019-02-01 NOTE — PROGRESS NOTES
[facility] Bariatric Surgery  [address]  [facility] [address]  [phone]        Patient Name:  Nichelle Casey.  :  1970      Date of Visit: 2019      Reason for Visit:   Annual Eval - 1 year postop    HPI: Nichelle Casey is a 48 y.o. female s/p LSG/paraHHR by Dr. Mccann on 18    Continues w/ BP issues.  Med adjustments have been ongoing.  Now following w/ Nephrology - Dr. Tello in [place].  She is understandably frustrated b/c she had hoped by having WLS she would be off most of her medications.  But otherwise she is grateful for her weight loss success.  Chronic back pain still much improved.  No issues w/ GERD.  Tolerating PO w/out issue.  Gets 100g prot/day.  Exercises 3-4 days/week - working on strengthening and toning.  Noted body fat % still 40.    Labs 10/24/18 - WNL.  Continues on MVI, Vit D 1000, iron daily.    Presurgery weight: 246 pounds.  Today's weight is 87.5 kg (193 lb) pounds, today's  Body mass index is 27.69 kg/m²., and her weight loss since surgery is 53 pounds.      Past Medical History:   Diagnosis Date   • Chronic back pain     herniated disc w/ sciatica   • Dyspepsia    • Dyspnea on exertion    • Elevated LFTs    • Fatigue    • Heartburn     prn OTC meds   • Hormone replacement therapy (HRT)     shell-menopausal   • HTN (hypertension)    • Hypertriglyceridemia    • Obesity    • Osteoarthritis      Past Surgical History:   Procedure Laterality Date   • GASTRIC SLEEVE LAPAROSCOPIC N/A 2018    Procedure: GASTRIC SLEEVE LAPAROSCOPIC;  Surgeon: Millie Mccann MD;  Location:  [facility] OR;  Service:    • PARAESOPHAGEAL HERNIA REPAIR N/A 2018    Procedure: PARAESOPHAGEAL HERNIA REPAIR LAPAROSCOPIC;  Surgeon: Millie Mccann MD;  Location:  [facility] OR;  Service:    • TUBAL ABDOMINAL LIGATION     • VAGINAL HYSTERECTOMY      partial - uterine fibroids, laparoscopic   • WISDOM TOOTH EXTRACTION    "    Outpatient Medications Marked as Taking for the 2/1/19 encounter (Office Visit) with Tamica Catherine PA   Medication Sig Dispense Refill   • BYSTOLIC 20 MG tablet Take 20 mg by mouth 2 (Two) Times a Day.     • cholecalciferol (VITAMIN D3) 1000 units tablet Take 1,000 Units by mouth Daily.     • CloNIDine (CATAPRES) 0.1 MG tablet Take 0.1 mg by mouth 2 (Two) Times a Day.     • irbesartan (AVAPRO) 300 MG tablet Take 300 mg by mouth Daily.     • niCARdipine (CARDENE) 20 MG capsule Take 20 mg by mouth 3 (Three) Times a Day.     • spironolactone (ALDACTONE) 25 MG tablet Take 25 mg by mouth Daily.     • torsemide (DEMADEX) 10 MG tablet Take 10 mg by mouth Daily.         Allergies   Allergen Reactions   • Prednisone Shortness Of Breath       Social History     Socioeconomic History   • Marital status:      Spouse name: Devang Casey   • Number of children: 2   • Years of education: 12th grade   • Highest education level: Not on file   Social Needs   • Financial resource strain: Not on file   • Food insecurity - worry: Not on file   • Food insecurity - inability: Not on file   • Transportation needs - medical: Not on file   • Transportation needs - non-medical: Not on file   Occupational History   • Occupation:    Tobacco Use   • Smoking status: Never Smoker   • Smokeless tobacco: Never Used   Substance and Sexual Activity   • Alcohol use: No   • Drug use: No   • Sexual activity: Yes     Partners: Male     Comment:     Other Topics Concern   • Not on file   Social History Narrative    Lives in [place], [place] w/ .   for [employer].         /90   Pulse 68   Ht 177.8 cm (70\")   Wt 87.5 kg (193 lb)   BMI 27.69 kg/m²     Physical Exam   Constitutional: She appears well-developed and well-nourished.   Cardiovascular: Normal rate.   Pulmonary/Chest: Effort normal.   Musculoskeletal: Normal range of motion.   Neurological: She is alert.   Psychiatric: " She has a normal mood and affect. Judgment and thought content normal.         Assessment:  1 year s/p LSG/paraHHR by Dr. Mccann on 1/23/18    ICD-10-CM ICD-9-CM   1. Essential hypertension I10 401.9   2. Chronic back pain, unspecified back location, unspecified back pain laterality M54.9 724.5    G89.29 338.29   3. S/P bariatric surgery Z98.84 V45.86       Plan:  Doing well from a bariatric standpoint.  Continue to follow w/ Nephrology for BP issues.  Continue w/ good food choices and healthy habits.  Recommended 1500 luana/day for weight maintenance and 1200 luana/day for weight loss w/ continued focus on high protein, low carb foods.  Continue w/ current vitamin regimen.  No labs needed today - will recheck at next visit.  Call w/ issues/concerns.        The patient was instructed to follow up in 6 months, sooner if needed.

## 2019-08-19 ENCOUNTER — OFFICE VISIT (OUTPATIENT)
Dept: BARIATRICS/WEIGHT MGMT | Facility: CLINIC | Age: 49
End: 2019-08-19

## 2019-08-19 VITALS
TEMPERATURE: 96.6 F | OXYGEN SATURATION: 100 % | DIASTOLIC BLOOD PRESSURE: 86 MMHG | HEIGHT: 69 IN | BODY MASS INDEX: 27.99 KG/M2 | SYSTOLIC BLOOD PRESSURE: 134 MMHG | HEART RATE: 56 BPM | WEIGHT: 189 LBS | RESPIRATION RATE: 18 BRPM

## 2019-08-19 DIAGNOSIS — K91.2 POSTGASTRECTOMY MALABSORPTION: ICD-10-CM

## 2019-08-19 DIAGNOSIS — Z90.3 POSTGASTRECTOMY MALABSORPTION: ICD-10-CM

## 2019-08-19 DIAGNOSIS — Z98.84 STATUS POST BARIATRIC SURGERY: ICD-10-CM

## 2019-08-19 DIAGNOSIS — E55.9 HYPOVITAMINOSIS D: ICD-10-CM

## 2019-08-19 DIAGNOSIS — Z13.0 SCREENING, IRON DEFICIENCY ANEMIA: ICD-10-CM

## 2019-08-19 DIAGNOSIS — Z13.21 MALNUTRITION SCREEN: ICD-10-CM

## 2019-08-19 DIAGNOSIS — R20.2 PARESTHESIA: Primary | ICD-10-CM

## 2019-08-19 DIAGNOSIS — R53.83 FATIGUE, UNSPECIFIED TYPE: ICD-10-CM

## 2019-08-19 PROCEDURE — 99214 OFFICE O/P EST MOD 30 MIN: CPT | Performed by: PHYSICIAN ASSISTANT

## 2019-08-19 PROCEDURE — 94690 O2 UPTK REST INDIRECT: CPT | Performed by: PHYSICIAN ASSISTANT

## 2019-08-19 NOTE — PROGRESS NOTES
"[facility] Bariatric Surgery  [address]  [facility] [address] [phone]        Patient Name:  Nichelle Casey.  :  1970        Reason for Visit:   19 months postop      HPI: Nichelle Casey is a 49 y.o. female s/p LSG/paraHHR by Dr. Mccann on 18    Doing well.  Pleased with progress. Wouldn't mind losing a few more pounds but also happy where she is.  Has some tingling in toes with prolonged standing, thinks her shoes may be too tight.  Thinks she is going through \"the change\", fatigued, more miramontes. Denies dysphagia, reflux, nausea, vomiting and abdominal pain.  Getting 100g prot/day. Breakfast: protein powder in coffee.  Lean meats and veggies for every meal. Lunch, light snack and dinner at work.  Snacks: P3 protein packs.  Calories 1400.  Drinking 64+ fluid oz/day.  12 month labs revealed bariatric levels wnl.  Taking MVI.  Not requring antacid.  Exercising 3 days a week, on feet all day at work all day now with new position. BP has improved.      Presurgery weight: 246 pounds.  Today's weight is 85.7 kg (189 lb) pounds, today's  Body mass index is 28.32 kg/m²., and her weight loss since surgery is 57 pounds.      Past Medical History:   Diagnosis Date   • Chronic back pain     herniated disc w/ sciatica   • Dyspepsia    • Dyspnea on exertion    • Elevated LFTs    • Fatigue    • Heartburn     prn OTC meds   • Hormone replacement therapy (HRT)     shell-menopausal   • HTN (hypertension)    • Hypertriglyceridemia    • Obesity    • Osteoarthritis      Past Surgical History:   Procedure Laterality Date   • GASTRIC SLEEVE LAPAROSCOPIC N/A 2018    Procedure: GASTRIC SLEEVE LAPAROSCOPIC;  Surgeon: Millie Mccann MD;  Location:  [facility];  Service:    • PARAESOPHAGEAL HERNIA REPAIR N/A 2018    Procedure: PARAESOPHAGEAL HERNIA REPAIR LAPAROSCOPIC;  Surgeon: Millie Mccann MD;  Location:  [facility] OR;  Service:    • TUBAL ABDOMINAL LIGATION  " "1990   • VAGINAL HYSTERECTOMY  2009    partial - uterine fibroids, laparoscopic   • WISDOM TOOTH EXTRACTION  1993     Outpatient Medications Marked as Taking for the 8/19/19 encounter (Office Visit) with Ana Cadena PA-C   Medication Sig Dispense Refill   • BYSTOLIC 20 MG tablet Take 20 mg by mouth 2 (Two) Times a Day.     • cholecalciferol (VITAMIN D3) 1000 units tablet Take 1,000 Units by mouth Daily.     • CloNIDine (CATAPRES) 0.1 MG tablet Take 0.1 mg by mouth 2 (Two) Times a Day.     • irbesartan (AVAPRO) 300 MG tablet Take 300 mg by mouth Daily.     • spironolactone (ALDACTONE) 25 MG tablet Take 25 mg by mouth Daily.     • torsemide (DEMADEX) 10 MG tablet Take 10 mg by mouth Daily.         Allergies   Allergen Reactions   • Prednisone Shortness Of Breath       Social History     Socioeconomic History   • Marital status:      Spouse name: Devang Casey   • Number of children: 2   • Years of education: 12th grade   • Highest education level: Not on file   Occupational History   • Occupation:    Tobacco Use   • Smoking status: Never Smoker   • Smokeless tobacco: Never Used   Substance and Sexual Activity   • Alcohol use: No   • Drug use: No   • Sexual activity: Yes     Partners: Male     Comment:     Social History Narrative    Lives in [place], [place] w/ .   for [employer].         /86 (BP Location: Left arm, Patient Position: Sitting, Cuff Size: Adult)   Pulse 56   Temp 96.6 °F (35.9 °C) (Temporal)   Resp 18   Ht 174 cm (68.5\")   Wt 85.7 kg (189 lb)   SpO2 100%   BMI 28.32 kg/m²     Physical Exam   Constitutional: She is oriented to person, place, and time. She appears well-developed and well-nourished.   HENT:   Head: Normocephalic and atraumatic.   Cardiovascular: Normal rate, regular rhythm and normal heart sounds.   Pulmonary/Chest: Effort normal and breath sounds normal. No respiratory distress. She has no wheezes.   Abdominal: " Soft. Bowel sounds are normal. She exhibits no distension. There is no tenderness.   Neurological: She is alert and oriented to person, place, and time.   Skin: Skin is warm and dry.   Psychiatric: She has a normal mood and affect. Her behavior is normal. Judgment and thought content normal.         Assessment:  19 months  s/p LSG/paraHHR by Dr. Mccann on 1/23/18    ICD-10-CM ICD-9-CM   1. Paresthesia R20.2 782.0   2. Fatigue, unspecified type R53.83 780.79   3. Hypovitaminosis D E55.9 268.9   4. Screening, iron deficiency anemia Z13.0 V78.0   5. Malnutrition screen Z13.21 V77.2   6. Postgastrectomy malabsorption K91.2 579.3    Z90.3    7. Status post bariatric surgery Z98.84 V45.86         Plan:  Continue w/ good food choices and healthy habits.  Continue to focus on high protein, low carb.  Keep tracking intake. BMR today to determine WLZ.  Continue routine exercise, encouraged strength training.  Routine bariatric labs ordered.  Continue vitamins w/ adjustments pending lab results.  Call w/ problems/concerns.     The patient was instructed to follow up in 6 months, sooner if needed.      Total time spent w/ patient 25 minutes and 15 minutes spent counseling the patient on nutrition and necessary dietary/lifestyle modifications.    Addendum: WLZ per BMR [id number], fast +31% metabolism

## 2019-08-21 LAB
25(OH)D3+25(OH)D2 SERPL-MCNC: 35.7 NG/ML (ref 30–100)
ALBUMIN SERPL-MCNC: 4.5 G/DL (ref 3.5–5.2)
ALBUMIN/GLOB SERPL: 2 G/DL
ALP SERPL-CCNC: 76 U/L (ref 39–117)
ALT SERPL-CCNC: 20 U/L (ref 1–33)
AST SERPL-CCNC: 17 U/L (ref 1–32)
BILIRUB SERPL-MCNC: 0.5 MG/DL (ref 0.2–1.2)
BUN SERPL-MCNC: 17 MG/DL (ref 6–20)
BUN/CREAT SERPL: 28.3 (ref 7–25)
CALCIUM SERPL-MCNC: 9.4 MG/DL (ref 8.6–10.5)
CHLORIDE SERPL-SCNC: 103 MMOL/L (ref 98–107)
CO2 SERPL-SCNC: 26.7 MMOL/L (ref 22–29)
CREAT SERPL-MCNC: 0.6 MG/DL (ref 0.57–1)
ERYTHROCYTE [DISTWIDTH] IN BLOOD BY AUTOMATED COUNT: 13 % (ref 12.3–15.4)
FERRITIN SERPL-MCNC: 44.3 NG/ML (ref 13–150)
FOLATE SERPL-MCNC: >20 NG/ML (ref 4.78–24.2)
GLOBULIN SER CALC-MCNC: 2.3 GM/DL
GLUCOSE SERPL-MCNC: 81 MG/DL (ref 65–99)
HCT VFR BLD AUTO: 44.6 % (ref 34–46.6)
HGB BLD-MCNC: 14.1 G/DL (ref 12–15.9)
Lab: NORMAL
MCH RBC QN AUTO: 29.6 PG (ref 26.6–33)
MCHC RBC AUTO-ENTMCNC: 31.6 G/DL (ref 31.5–35.7)
MCV RBC AUTO: 93.7 FL (ref 79–97)
METHYLMALONATE SERPL-SCNC: 193 NMOL/L (ref 0–378)
PLATELET # BLD AUTO: 207 10*3/MM3 (ref 140–450)
POTASSIUM SERPL-SCNC: 4.4 MMOL/L (ref 3.5–5.2)
PROT SERPL-MCNC: 6.8 G/DL (ref 6–8.5)
RBC # BLD AUTO: 4.76 10*6/MM3 (ref 3.77–5.28)
SODIUM SERPL-SCNC: 141 MMOL/L (ref 136–145)
VIT B1 BLD-SCNC: 193.4 NMOL/L (ref 66.5–200)
WBC # BLD AUTO: 6.12 10*3/MM3 (ref 3.4–10.8)

## 2020-02-19 ENCOUNTER — OFFICE VISIT (OUTPATIENT)
Dept: BARIATRICS/WEIGHT MGMT | Facility: CLINIC | Age: 50
End: 2020-02-19

## 2020-02-19 VITALS
BODY MASS INDEX: 27.99 KG/M2 | HEIGHT: 69 IN | RESPIRATION RATE: 18 BRPM | OXYGEN SATURATION: 99 % | HEART RATE: 63 BPM | SYSTOLIC BLOOD PRESSURE: 130 MMHG | WEIGHT: 189 LBS | TEMPERATURE: 97.5 F | DIASTOLIC BLOOD PRESSURE: 76 MMHG

## 2020-02-19 DIAGNOSIS — Z98.84 STATUS POST BARIATRIC SURGERY: ICD-10-CM

## 2020-02-19 DIAGNOSIS — R53.83 FATIGUE, UNSPECIFIED TYPE: Primary | ICD-10-CM

## 2020-02-19 DIAGNOSIS — K91.2 POSTGASTRECTOMY MALABSORPTION: ICD-10-CM

## 2020-02-19 DIAGNOSIS — M54.9 CHRONIC BACK PAIN, UNSPECIFIED BACK LOCATION, UNSPECIFIED BACK PAIN LATERALITY: ICD-10-CM

## 2020-02-19 DIAGNOSIS — E78.5 HYPERLIPIDEMIA, UNSPECIFIED HYPERLIPIDEMIA TYPE: ICD-10-CM

## 2020-02-19 DIAGNOSIS — Z90.3 POSTGASTRECTOMY MALABSORPTION: ICD-10-CM

## 2020-02-19 DIAGNOSIS — Z13.21 MALNUTRITION SCREEN: ICD-10-CM

## 2020-02-19 DIAGNOSIS — I10 ESSENTIAL HYPERTENSION: ICD-10-CM

## 2020-02-19 DIAGNOSIS — G89.29 CHRONIC BACK PAIN, UNSPECIFIED BACK LOCATION, UNSPECIFIED BACK PAIN LATERALITY: ICD-10-CM

## 2020-02-19 DIAGNOSIS — E55.9 HYPOVITAMINOSIS D: ICD-10-CM

## 2020-02-19 DIAGNOSIS — Z13.0 SCREENING, IRON DEFICIENCY ANEMIA: ICD-10-CM

## 2020-02-19 PROCEDURE — 99214 OFFICE O/P EST MOD 30 MIN: CPT | Performed by: SURGERY

## 2020-02-19 NOTE — PROGRESS NOTES
[facility] Bariatric Surgery  [address]  [address]  [facility] [address] [phone]        Patient Name:  Nichelle Casey.  :  1970      Date of Visit: 2020      Reason for Visit:   2 years postop      HPI: Nichelle Casey is a 49 y.o. female s/p LSG/paraHHR by Dr. Mccann on 18    Down to 2 antihypertensives, but back up to 5.  Has been checked for renal artery stenosis and pheochromocytoma--both normal.  Both her dad and sister had refractory hypertension as she does.    Some fatigue--attributes to HTN meds.    GERD is gone.    Doing well.  No issues/concerns. Denies dysphagia, reflux, nausea, vomiting and abdominal pain.  Getting 100+g prot/day.  Drinking 64 fluid oz/day. Last labs revealed no vitamin deficiencies. Taking MVI + iron.  Exercises.  Active at work.     Presurgery weight: 246 pounds.  Today's weight is 85.7 kg (189 lb) pounds, today's  Body mass index is 28.32 kg/m²., and her weight loss since surgery is 57 pounds.      Past Medical History:   Diagnosis Date   • Chronic back pain     herniated disc w/ sciatica   • Dyspepsia    • Dyspnea on exertion    • Elevated LFTs    • Fatigue    • Heartburn     prn OTC meds   • Hormone replacement therapy (HRT)     shell-menopausal   • HTN (hypertension)    • Hypertriglyceridemia    • Obesity    • Osteoarthritis      Past Surgical History:   Procedure Laterality Date   • GASTRIC SLEEVE LAPAROSCOPIC N/A 2018    Procedure: GASTRIC SLEEVE LAPAROSCOPIC;  Surgeon: Millie Mccann MD;  Location: [facility] OR;  Service:    • PARAESOPHAGEAL HERNIA REPAIR N/A 2018    Procedure: PARAESOPHAGEAL HERNIA REPAIR LAPAROSCOPIC;  Surgeon: Millie Mccann MD;  Location:  [facility];  Service:    • TUBAL ABDOMINAL LIGATION     • VAGINAL HYSTERECTOMY      partial - uterine fibroids, laparoscopic   • WISDOM TOOTH EXTRACTION       Outpatient Medications Marked as Taking for the 20 encounter (Office  "Visit) with Millie Mccann MD   Medication Sig Dispense Refill   • BYSTOLIC 20 MG tablet Take 20 mg by mouth 2 (Two) Times a Day.     • cholecalciferol (VITAMIN D3) 1000 units tablet Take 1,000 Units by mouth Daily.     • CloNIDine (CATAPRES) 0.1 MG tablet Take 0.1 mg by mouth As Needed.     • irbesartan (AVAPRO) 300 MG tablet Take 150 mg by mouth Daily.     • spironolactone (ALDACTONE) 25 MG tablet Take 25 mg by mouth Daily.     • torsemide (DEMADEX) 10 MG tablet Take 10 mg by mouth Daily.         Allergies   Allergen Reactions   • Prednisone Shortness Of Breath       Social History     Socioeconomic History   • Marital status:      Spouse name: Devang Casey   • Number of children: 2   • Years of education: 12th grade   • Highest education level: Not on file   Occupational History   • Occupation:    Tobacco Use   • Smoking status: Never Smoker   • Smokeless tobacco: Never Used   Substance and Sexual Activity   • Alcohol use: No   • Drug use: No   • Sexual activity: Yes     Partners: Male     Comment:     Social History Narrative    Lives in [place], [place] w/ .   for [employer].         /76 (BP Location: Left arm, Patient Position: Sitting, Cuff Size: Adult)   Pulse 63   Temp 97.5 °F (36.4 °C) (Temporal)   Resp 18   Ht 174 cm (68.5\")   Wt 85.7 kg (189 lb)   SpO2 99%   BMI 28.32 kg/m²     Physical Exam   Constitutional: She is oriented to person, place, and time. She appears well-developed and well-nourished. No distress.   HENT:   Head: Normocephalic and atraumatic.   Mouth/Throat: No oropharyngeal exudate.   Eyes: Pupils are equal, round, and reactive to light. Conjunctivae and EOM are normal.   Pulmonary/Chest: Effort normal. No respiratory distress.   Abdominal: Soft. She exhibits no distension.   Neurological: She is alert and oriented to person, place, and time. No cranial nerve deficit.   Skin: Skin is warm and dry. She is not " diaphoretic. No pallor.   Psychiatric: She has a normal mood and affect. Her behavior is normal. Thought content normal.         Assessment:  2 years s/p LSG/paraHHR by Dr. Mccann on 1/23/18      ICD-10-CM ICD-9-CM   1. Fatigue, unspecified type R53.83 780.79   2. Hypovitaminosis D E55.9 268.9   3. Postgastrectomy malabsorption K91.2 579.3    Z90.3    4. Screening, iron deficiency anemia Z13.0 V78.0   5. Malnutrition screen Z13.21 V77.2   6. Status post bariatric surgery Z98.84 V45.86   7. Essential hypertension I10 401.9   8. Hyperlipidemia, unspecified hyperlipidemia type E78.5 272.4   9. Chronic back pain, unspecified back location, unspecified back pain laterality M54.9 724.5    G89.29 338.29         Plan:  Doing well. Continue w/ good food choices and healthy habits.  Continue protein >70g/day.  Continue routine exercise.  Routine bariatric labs ordered.  Continue vitamins w/ adjustments pending lab results.  Call w/ problems/concerns.     The patient was instructed to follow up in 1 year, sooner if needed.    note: approx 15 of the 25 minute visit was spent counseling on nutrition and necessary dietary/lifestyle modifications face to face.    Millie Mccann MD

## 2020-02-22 LAB
25(OH)D3+25(OH)D2 SERPL-MCNC: 32.9 NG/ML (ref 30–100)
ALBUMIN SERPL-MCNC: 4.5 G/DL (ref 3.8–4.8)
ALBUMIN/GLOB SERPL: 2 {RATIO} (ref 1.2–2.2)
ALP SERPL-CCNC: 70 IU/L (ref 39–117)
ALT SERPL-CCNC: 14 IU/L (ref 0–32)
AST SERPL-CCNC: 15 IU/L (ref 0–40)
BASOPHILS # BLD AUTO: 0 X10E3/UL (ref 0–0.2)
BASOPHILS NFR BLD AUTO: 0 %
BILIRUB SERPL-MCNC: 0.5 MG/DL (ref 0–1.2)
BUN SERPL-MCNC: 17 MG/DL (ref 6–24)
BUN/CREAT SERPL: 24 (ref 9–23)
CALCIUM SERPL-MCNC: 8.9 MG/DL (ref 8.7–10.2)
CHLORIDE SERPL-SCNC: 104 MMOL/L (ref 96–106)
CO2 SERPL-SCNC: 25 MMOL/L (ref 20–29)
CREAT SERPL-MCNC: 0.72 MG/DL (ref 0.57–1)
EOSINOPHIL # BLD AUTO: 0.1 X10E3/UL (ref 0–0.4)
EOSINOPHIL NFR BLD AUTO: 1 %
ERYTHROCYTE [DISTWIDTH] IN BLOOD BY AUTOMATED COUNT: 13 % (ref 11.7–15.4)
FERRITIN SERPL-MCNC: 13 NG/ML (ref 15–150)
FOLATE SERPL-MCNC: 15.8 NG/ML
GLOBULIN SER CALC-MCNC: 2.2 G/DL (ref 1.5–4.5)
GLUCOSE SERPL-MCNC: 84 MG/DL (ref 65–99)
HCT VFR BLD AUTO: 39.9 % (ref 34–46.6)
HGB BLD-MCNC: 12.7 G/DL (ref 11.1–15.9)
IMM GRANULOCYTES # BLD AUTO: 0 X10E3/UL (ref 0–0.1)
IMM GRANULOCYTES NFR BLD AUTO: 0 %
IRON SERPL-MCNC: 44 UG/DL (ref 27–159)
LYMPHOCYTES # BLD AUTO: 1.4 X10E3/UL (ref 0.7–3.1)
LYMPHOCYTES NFR BLD AUTO: 18 %
Lab: NORMAL
MCH RBC QN AUTO: 27.9 PG (ref 26.6–33)
MCHC RBC AUTO-ENTMCNC: 31.8 G/DL (ref 31.5–35.7)
MCV RBC AUTO: 88 FL (ref 79–97)
METHYLMALONATE SERPL-SCNC: 198 NMOL/L (ref 0–378)
MONOCYTES # BLD AUTO: 0.6 X10E3/UL (ref 0.1–0.9)
MONOCYTES NFR BLD AUTO: 9 %
NEUTROPHILS # BLD AUTO: 5.3 X10E3/UL (ref 1.4–7)
NEUTROPHILS NFR BLD AUTO: 72 %
PLATELET # BLD AUTO: 265 X10E3/UL (ref 150–450)
POTASSIUM SERPL-SCNC: 4 MMOL/L (ref 3.5–5.2)
PREALB SERPL-MCNC: 23 MG/DL (ref 12–34)
PROT SERPL-MCNC: 6.7 G/DL (ref 6–8.5)
RBC # BLD AUTO: 4.56 X10E6/UL (ref 3.77–5.28)
SODIUM SERPL-SCNC: 142 MMOL/L (ref 134–144)
VIT B1 BLD-SCNC: 166.3 NMOL/L (ref 66.5–200)
WBC # BLD AUTO: 7.4 X10E3/UL (ref 3.4–10.8)

## 2021-03-23 ENCOUNTER — BULK ORDERING (OUTPATIENT)
Dept: CASE MANAGEMENT | Facility: OTHER | Age: 51
End: 2021-03-23

## 2021-03-23 DIAGNOSIS — Z23 IMMUNIZATION DUE: ICD-10-CM

## 2021-04-01 ENCOUNTER — OFFICE VISIT (OUTPATIENT)
Dept: BARIATRICS/WEIGHT MGMT | Facility: CLINIC | Age: 51
End: 2021-04-01

## 2021-04-01 VITALS
BODY MASS INDEX: 28.58 KG/M2 | TEMPERATURE: 98.4 F | DIASTOLIC BLOOD PRESSURE: 66 MMHG | RESPIRATION RATE: 18 BRPM | WEIGHT: 193 LBS | OXYGEN SATURATION: 99 % | HEIGHT: 69 IN | HEART RATE: 68 BPM | SYSTOLIC BLOOD PRESSURE: 122 MMHG

## 2021-04-01 DIAGNOSIS — Z13.0 SCREENING, IRON DEFICIENCY ANEMIA: ICD-10-CM

## 2021-04-01 DIAGNOSIS — R53.83 FATIGUE, UNSPECIFIED TYPE: ICD-10-CM

## 2021-04-01 DIAGNOSIS — Z98.84 STATUS POST BARIATRIC SURGERY: Primary | ICD-10-CM

## 2021-04-01 DIAGNOSIS — E55.9 HYPOVITAMINOSIS D: ICD-10-CM

## 2021-04-01 DIAGNOSIS — K91.2 POSTGASTRECTOMY MALABSORPTION: ICD-10-CM

## 2021-04-01 DIAGNOSIS — Z13.21 MALNUTRITION SCREEN: ICD-10-CM

## 2021-04-01 DIAGNOSIS — Z90.3 POSTGASTRECTOMY MALABSORPTION: ICD-10-CM

## 2021-04-01 PROCEDURE — 99214 OFFICE O/P EST MOD 30 MIN: CPT | Performed by: PHYSICIAN ASSISTANT

## 2021-04-01 RX ORDER — AMLODIPINE BESYLATE 5 MG/1
5 TABLET ORAL DAILY
COMMUNITY
Start: 2021-01-12

## 2021-04-01 NOTE — PROGRESS NOTES
[facility] Bariatric Surgery  [address]  [address]  [facility] [address]  [phone]        Patient Name:  Nichelle Casey.  :  1970        Reason for Visit:   3 years postop      HPI: Nichelle Casey is a 50 y.o. female  s/p LSG/paraHHR by Dr. Mccann on 18    LOV 2020- weight 189lb, refractory HTN, GERD resolved    Doing well.  Happy with progress.  Would like to drop her fat percentage. Has been crazy year, watching grandkids doing NTI before work, they went back to school recently. No issues/concerns. Mild heartburn with saucy or spicy foods in last month. Not taking anything for it, diet controlled.  Denies dysphagia, nausea, vomiting, abdominal pain, pulmonary issues and fevers.  Getting +g prot/day. Coffee protien in morning, protein shake at lunch, salad at work, dinner. Snacks on nuts.   Drinking 100 fluid oz/day.  Last labs revealed bariatric levels wnl .  Taking MVI and Vit D.  Not on antacid.  Exercising- weekends.     Presurgery weight: 246 pounds.  Today's weight is 87.5 kg (193 lb) pounds, today's  Body mass index is 28.92 kg/m²., and@ weight loss since surgery is 53 pounds.      Past Medical History:   Diagnosis Date   • Chronic back pain     herniated disc w/ sciatica   • Dyspepsia    • Dyspnea on exertion    • Elevated LFTs    • Fatigue    • Heartburn     prn OTC meds   • Hormone replacement therapy (HRT)     shell-menopausal   • HTN (hypertension)    • Hypertriglyceridemia    • Obesity    • Osteoarthritis      Past Surgical History:   Procedure Laterality Date   • GASTRIC SLEEVE LAPAROSCOPIC N/A 2018    Procedure: GASTRIC SLEEVE LAPAROSCOPIC;  Surgeon: Millie Mccann MD;  Location: [facility] OR;  Service:    • PARAESOPHAGEAL HERNIA REPAIR N/A 2018    Procedure: PARAESOPHAGEAL HERNIA REPAIR LAPAROSCOPIC;  Surgeon: Millie Mccann MD;  Location:  [facility];  Service:    • TUBAL ABDOMINAL LIGATION     • VAGINAL  "HYSTERECTOMY  2009    partial - uterine fibroids, laparoscopic   • WISDOM TOOTH EXTRACTION  1993     Outpatient Medications Marked as Taking for the 4/1/21 encounter (Office Visit) with Ana Cadena PA-C   Medication Sig Dispense Refill   • amLODIPine (NORVASC) 5 MG tablet Take 5 mg by mouth Daily.     • BYSTOLIC 20 MG tablet Take 20 mg by mouth 2 (Two) Times a Day.     • cholecalciferol (VITAMIN D3) 1000 units tablet Take 1,000 Units by mouth Daily.     • CloNIDine (CATAPRES) 0.1 MG tablet Take 0.1 mg by mouth As Needed.     • irbesartan (AVAPRO) 300 MG tablet Take 150 mg by mouth Daily.     • spironolactone (ALDACTONE) 25 MG tablet Take 25 mg by mouth Daily.     • torsemide (DEMADEX) 10 MG tablet Take 10 mg by mouth Daily.         Allergies   Allergen Reactions   • Prednisone Shortness Of Breath       Social History     Socioeconomic History   • Marital status:      Spouse name: Devang Casey   • Number of children: 2   • Years of education: 12th grade   • Highest education level: Not on file   Tobacco Use   • Smoking status: Never Smoker   • Smokeless tobacco: Never Used   Substance and Sexual Activity   • Alcohol use: No   • Drug use: No   • Sexual activity: Yes     Partners: Male     Comment:         /66 (BP Location: Left arm, Patient Position: Sitting, Cuff Size: Large Adult)   Pulse 68   Temp 98.4 °F (36.9 °C) (Temporal)   Resp 18   Ht 174 cm (68.5\")   Wt 87.5 kg (193 lb)   SpO2 99%   BMI 28.92 kg/m²     Physical Exam  Vitals reviewed.   Constitutional:       Appearance: She is well-developed.   HENT:      Head: Normocephalic.      Comments: mask  Neck:      Thyroid: No thyromegaly.   Cardiovascular:      Rate and Rhythm: Normal rate and regular rhythm.      Heart sounds: Normal heart sounds.   Pulmonary:      Effort: Pulmonary effort is normal. No respiratory distress.      Breath sounds: Normal breath sounds. No wheezing.   Abdominal:      General: Bowel sounds are normal. " There is no distension.      Palpations: Abdomen is soft.      Tenderness: There is no abdominal tenderness.   Musculoskeletal:         General: Normal range of motion.      Cervical back: Normal range of motion and neck supple.   Skin:     General: Skin is warm and dry.   Neurological:      Mental Status: She is alert and oriented to person, place, and time.   Psychiatric:         Mood and Affect: Mood normal.         Behavior: Behavior normal.         Thought Content: Thought content normal.         Judgment: Judgment normal.           Assessment:  3 years  s/p LSG/paraHHR by Dr. Mccann on 1/23/18    ICD-10-CM ICD-9-CM   1. Status post bariatric surgery  Z98.84 V45.86   2. Fatigue, unspecified type  R53.83 780.79   3. Hypovitaminosis D  E55.9 268.9   4. Screening, iron deficiency anemia  Z13.0 V78.0   5. Malnutrition screen  Z13.21 V77.2   6. Postgastrectomy malabsorption  K91.2 579.3    Z90.3          Plan:  Doing well. Gave handout to add strength training.can use tums/ pepcid prn heartburn.  Continue w/ good food choices and healthy habits.  Continue to focus on high protein, low carb.  Keep tracking intake, target 1300 luana/ tanita.  Continue routine exercise.  Routine bariatric labs ordered.  Continue vitamins w/ adjustments pending lab results.  Call w/ problems/concerns.     Patient's Body mass index is 28.92 kg/m². BMI is above normal parameters. Recommendations include: exercise counseling and nutrition counseling.        The patient was instructed to follow up in 1 year, sooner if needed.

## 2021-04-08 LAB
25(OH)D3+25(OH)D2 SERPL-MCNC: 88.2 NG/ML (ref 30–100)
ALBUMIN SERPL-MCNC: 4.5 G/DL (ref 3.5–5.2)
ALBUMIN/GLOB SERPL: 2 G/DL
ALP SERPL-CCNC: 71 U/L (ref 39–117)
ALT SERPL-CCNC: 16 U/L (ref 1–33)
AST SERPL-CCNC: 16 U/L (ref 1–32)
BASOPHILS # BLD AUTO: 0.03 10*3/MM3 (ref 0–0.2)
BASOPHILS NFR BLD AUTO: 0.6 % (ref 0–1.5)
BILIRUB SERPL-MCNC: 0.7 MG/DL (ref 0–1.2)
BUN SERPL-MCNC: 23 MG/DL (ref 6–20)
BUN/CREAT SERPL: 35.9 (ref 7–25)
CALCIUM SERPL-MCNC: 9.6 MG/DL (ref 8.6–10.5)
CHLORIDE SERPL-SCNC: 104 MMOL/L (ref 98–107)
CO2 SERPL-SCNC: 25.2 MMOL/L (ref 22–29)
CREAT SERPL-MCNC: 0.64 MG/DL (ref 0.57–1)
EOSINOPHIL # BLD AUTO: 0.06 10*3/MM3 (ref 0–0.4)
EOSINOPHIL NFR BLD AUTO: 1.2 % (ref 0.3–6.2)
ERYTHROCYTE [DISTWIDTH] IN BLOOD BY AUTOMATED COUNT: 12.6 % (ref 12.3–15.4)
FERRITIN SERPL-MCNC: 20.1 NG/ML (ref 13–150)
FOLATE SERPL-MCNC: >20 NG/ML (ref 4.78–24.2)
GLOBULIN SER CALC-MCNC: 2.3 GM/DL
GLUCOSE SERPL-MCNC: 85 MG/DL (ref 65–99)
HCT VFR BLD AUTO: 41.7 % (ref 34–46.6)
HGB BLD-MCNC: 13.9 G/DL (ref 12–15.9)
IMM GRANULOCYTES # BLD AUTO: 0.01 10*3/MM3 (ref 0–0.05)
IMM GRANULOCYTES NFR BLD AUTO: 0.2 % (ref 0–0.5)
IRON SERPL-MCNC: 123 MCG/DL (ref 37–145)
LYMPHOCYTES # BLD AUTO: 1.23 10*3/MM3 (ref 0.7–3.1)
LYMPHOCYTES NFR BLD AUTO: 24.7 % (ref 19.6–45.3)
Lab: NORMAL
MCH RBC QN AUTO: 29.8 PG (ref 26.6–33)
MCHC RBC AUTO-ENTMCNC: 33.3 G/DL (ref 31.5–35.7)
MCV RBC AUTO: 89.3 FL (ref 79–97)
METHYLMALONATE SERPL-SCNC: 226 NMOL/L (ref 0–378)
MONOCYTES # BLD AUTO: 0.38 10*3/MM3 (ref 0.1–0.9)
MONOCYTES NFR BLD AUTO: 7.6 % (ref 5–12)
NEUTROPHILS # BLD AUTO: 3.27 10*3/MM3 (ref 1.7–7)
NEUTROPHILS NFR BLD AUTO: 65.7 % (ref 42.7–76)
NRBC BLD AUTO-RTO: 0 /100 WBC (ref 0–0.2)
PLATELET # BLD AUTO: 218 10*3/MM3 (ref 140–450)
POTASSIUM SERPL-SCNC: 3.9 MMOL/L (ref 3.5–5.2)
PREALB SERPL-MCNC: 22 MG/DL (ref 10–36)
PROT SERPL-MCNC: 6.8 G/DL (ref 6–8.5)
RBC # BLD AUTO: 4.67 10*6/MM3 (ref 3.77–5.28)
SODIUM SERPL-SCNC: 141 MMOL/L (ref 136–145)
VIT B1 BLD-SCNC: 173 NMOL/L (ref 66.5–200)
WBC # BLD AUTO: 4.98 10*3/MM3 (ref 3.4–10.8)

## (undated) DEVICE — PK BARIATRIC 10

## (undated) DEVICE — MEDI-VAC YANKAUER SUCTION HANDLE W/BULBOUS TIP: Brand: CARDINAL HEALTH

## (undated) DEVICE — ENDOPATH XCEL BLADELESS TROCARS WITH STABILITY SLEEVES: Brand: ENDOPATH XCEL

## (undated) DEVICE — GLV SURG DERMASSURE GRN LF PF 7.0

## (undated) DEVICE — ENDOPATH XCEL UNIVERSAL TROCAR STABLILITY SLEEVES: Brand: ENDOPATH XCEL

## (undated) DEVICE — AIRWY 90MM NO9

## (undated) DEVICE — SUT MONOCRYL PLS ANTIB UND 3/0  PS1 27IN

## (undated) DEVICE — FLTR PLUMEPORT LAP W/CONN STRL

## (undated) DEVICE — ECHELON FLEX POWERED PLUS LONG ARTICULATING ENDOSCOPIC LINEAR CUTTER, 60MM: Brand: ECHELON FLEX

## (undated) DEVICE — CONTN GRAD MEAS TRIANG 32OZ BLK

## (undated) DEVICE — DRN PENRS 1/2X18IN LTX

## (undated) DEVICE — SYS CLS PORTSITE CT CLOSESURE 5AND10/12

## (undated) DEVICE — GLV SURG SENSICARE W/ALOE PF LF 7 STRL

## (undated) DEVICE — TROCAR: Brand: KII FIOS FIRST ENTRY

## (undated) DEVICE — GLV SURG SENSICARE MICRO PF LF 6.5 STRL

## (undated) DEVICE — MEDI-VAC NON-CONDUCTIVE SUCTION TUBING: Brand: CARDINAL HEALTH

## (undated) DEVICE — 3M™ STERI-STRIP™ REINFORCED ADHESIVE SKIN CLOSURES, R1547, 1/2 IN X 4 IN (12 MM X 100 MM), 6 STRIPS/ENVELOPE: Brand: 3M™ STERI-STRIP™

## (undated) DEVICE — HARMONIC HD 1000I SHEARS, 36CM SHAFT LENGTH: Brand: HARMONIC

## (undated) DEVICE — 50" SINGLE PATIENT USE HOVERMATT: Brand: SINGLE PATIENT USE HOVERMATT

## (undated) DEVICE — TISSUE RETRIEVAL SYSTEM: Brand: INZII RETRIEVAL SYSTEM

## (undated) DEVICE — [HIGH FLOW INSUFFLATOR,  DO NOT USE IF PACKAGE IS DAMAGED,  KEEP DRY,  KEEP AWAY FROM SUNLIGHT,  PROTECT FROM HEAT AND RADIOACTIVE SOURCES.]: Brand: PNEUMOSURE

## (undated) DEVICE — SOL LR 1000ML

## (undated) DEVICE — ADAPT ST INFUS ADMIN SYR 70IN

## (undated) DEVICE — APPL HEMOS FOR DELIVERY FLOSEAL

## (undated) DEVICE — APL DUPLOSPRAYER MIS 40CM